# Patient Record
Sex: MALE | Race: WHITE | NOT HISPANIC OR LATINO | URBAN - METROPOLITAN AREA
[De-identification: names, ages, dates, MRNs, and addresses within clinical notes are randomized per-mention and may not be internally consistent; named-entity substitution may affect disease eponyms.]

---

## 2022-01-09 ENCOUNTER — INPATIENT (INPATIENT)
Facility: HOSPITAL | Age: 82
LOS: 4 days | Discharge: HOME | DRG: 177 | End: 2022-01-14
Attending: STUDENT IN AN ORGANIZED HEALTH CARE EDUCATION/TRAINING PROGRAM | Admitting: STUDENT IN AN ORGANIZED HEALTH CARE EDUCATION/TRAINING PROGRAM
Payer: MEDICARE

## 2022-01-09 VITALS
DIASTOLIC BLOOD PRESSURE: 75 MMHG | SYSTOLIC BLOOD PRESSURE: 151 MMHG | OXYGEN SATURATION: 95 % | HEART RATE: 90 BPM | TEMPERATURE: 98 F | RESPIRATION RATE: 20 BRPM

## 2022-01-09 DIAGNOSIS — J96.01 ACUTE RESPIRATORY FAILURE WITH HYPOXIA: ICD-10-CM

## 2022-01-09 LAB
ALBUMIN SERPL ELPH-MCNC: 3.8 G/DL — SIGNIFICANT CHANGE UP (ref 3.3–5)
ALP SERPL-CCNC: 81 U/L — SIGNIFICANT CHANGE UP (ref 40–120)
ALT FLD-CCNC: 18 U/L — SIGNIFICANT CHANGE UP (ref 10–45)
ANION GAP SERPL CALC-SCNC: 17 MMOL/L — SIGNIFICANT CHANGE UP (ref 5–17)
AST SERPL-CCNC: 15 U/L — SIGNIFICANT CHANGE UP (ref 10–40)
BASOPHILS # BLD AUTO: 0.03 K/UL — SIGNIFICANT CHANGE UP (ref 0–0.2)
BASOPHILS NFR BLD AUTO: 0.3 % — SIGNIFICANT CHANGE UP (ref 0–2)
BILIRUB SERPL-MCNC: 0.8 MG/DL — SIGNIFICANT CHANGE UP (ref 0.2–1.2)
BUN SERPL-MCNC: 17 MG/DL — SIGNIFICANT CHANGE UP (ref 7–23)
CALCIUM SERPL-MCNC: 8.9 MG/DL — SIGNIFICANT CHANGE UP (ref 8.4–10.5)
CHLORIDE SERPL-SCNC: 94 MMOL/L — LOW (ref 96–108)
CO2 SERPL-SCNC: 20 MMOL/L — LOW (ref 22–31)
CREAT SERPL-MCNC: 1.18 MG/DL — SIGNIFICANT CHANGE UP (ref 0.5–1.3)
D DIMER BLD IA.RAPID-MCNC: 353 NG/ML DDU — HIGH
EOSINOPHIL # BLD AUTO: 0.16 K/UL — SIGNIFICANT CHANGE UP (ref 0–0.5)
EOSINOPHIL NFR BLD AUTO: 1.7 % — SIGNIFICANT CHANGE UP (ref 0–6)
GLUCOSE SERPL-MCNC: 124 MG/DL — HIGH (ref 70–99)
HCT VFR BLD CALC: 43.3 % — SIGNIFICANT CHANGE UP (ref 39–50)
HGB BLD-MCNC: 14.6 G/DL — SIGNIFICANT CHANGE UP (ref 13–17)
IMM GRANULOCYTES NFR BLD AUTO: 0.6 % — SIGNIFICANT CHANGE UP (ref 0–1.5)
LYMPHOCYTES # BLD AUTO: 0.93 K/UL — LOW (ref 1–3.3)
LYMPHOCYTES # BLD AUTO: 9.8 % — LOW (ref 13–44)
MCHC RBC-ENTMCNC: 28.1 PG — SIGNIFICANT CHANGE UP (ref 27–34)
MCHC RBC-ENTMCNC: 33.7 GM/DL — SIGNIFICANT CHANGE UP (ref 32–36)
MCV RBC AUTO: 83.3 FL — SIGNIFICANT CHANGE UP (ref 80–100)
MONOCYTES # BLD AUTO: 1.27 K/UL — HIGH (ref 0–0.9)
MONOCYTES NFR BLD AUTO: 13.4 % — SIGNIFICANT CHANGE UP (ref 2–14)
NEUTROPHILS # BLD AUTO: 7.04 K/UL — SIGNIFICANT CHANGE UP (ref 1.8–7.4)
NEUTROPHILS NFR BLD AUTO: 74.2 % — SIGNIFICANT CHANGE UP (ref 43–77)
NRBC # BLD: 0 /100 WBCS — SIGNIFICANT CHANGE UP (ref 0–0)
PLATELET # BLD AUTO: 449 K/UL — HIGH (ref 150–400)
POTASSIUM SERPL-MCNC: 3.1 MMOL/L — LOW (ref 3.5–5.3)
POTASSIUM SERPL-SCNC: 3.1 MMOL/L — LOW (ref 3.5–5.3)
PROT SERPL-MCNC: 8 G/DL — SIGNIFICANT CHANGE UP (ref 6–8.3)
RBC # BLD: 5.2 M/UL — SIGNIFICANT CHANGE UP (ref 4.2–5.8)
RBC # FLD: 13.7 % — SIGNIFICANT CHANGE UP (ref 10.3–14.5)
SODIUM SERPL-SCNC: 131 MMOL/L — LOW (ref 135–145)
WBC # BLD: 9.49 K/UL — SIGNIFICANT CHANGE UP (ref 3.8–10.5)
WBC # FLD AUTO: 9.49 K/UL — SIGNIFICANT CHANGE UP (ref 3.8–10.5)

## 2022-01-09 PROCEDURE — 99223 1ST HOSP IP/OBS HIGH 75: CPT

## 2022-01-09 PROCEDURE — 99285 EMERGENCY DEPT VISIT HI MDM: CPT | Mod: CS,GC

## 2022-01-09 PROCEDURE — 71045 X-RAY EXAM CHEST 1 VIEW: CPT | Mod: 26

## 2022-01-09 RX ORDER — IPRATROPIUM/ALBUTEROL SULFATE 18-103MCG
3 AEROSOL WITH ADAPTER (GRAM) INHALATION ONCE
Refills: 0 | Status: COMPLETED | OUTPATIENT
Start: 2022-01-09 | End: 2022-01-09

## 2022-01-09 RX ORDER — DEXAMETHASONE 0.5 MG/5ML
6 ELIXIR ORAL ONCE
Refills: 0 | Status: COMPLETED | OUTPATIENT
Start: 2022-01-09 | End: 2022-01-09

## 2022-01-09 RX ORDER — SODIUM CHLORIDE 9 MG/ML
500 INJECTION INTRAMUSCULAR; INTRAVENOUS; SUBCUTANEOUS ONCE
Refills: 0 | Status: COMPLETED | OUTPATIENT
Start: 2022-01-09 | End: 2022-01-09

## 2022-01-09 RX ADMIN — Medication 3 MILLILITER(S): at 21:33

## 2022-01-09 RX ADMIN — Medication 6 MILLIGRAM(S): at 21:32

## 2022-01-09 RX ADMIN — SODIUM CHLORIDE 500 MILLILITER(S): 9 INJECTION INTRAMUSCULAR; INTRAVENOUS; SUBCUTANEOUS at 21:41

## 2022-01-09 RX ADMIN — SODIUM CHLORIDE 500 MILLILITER(S): 9 INJECTION INTRAMUSCULAR; INTRAVENOUS; SUBCUTANEOUS at 22:51

## 2022-01-09 NOTE — H&P ADULT - NSHPLABSRESULTS_GEN_ALL_CORE
Labs personally reviewed:                          14.6   9.49  )-----------( 449      ( 09 Jan 2022 21:57 )             43.3     01-09    131<L>  |  94<L>  |  17  ----------------------------<  124<H>  3.1<L>   |  20<L>  |  1.18    Ca    8.9      09 Jan 2022 21:57    TPro  8.0  /  Alb  3.8  /  TBili  0.8  /  DBili  x   /  AST  15  /  ALT  18  /  AlkPhos  81  01-09        LIVER FUNCTIONS - ( 09 Jan 2022 21:57 )  Alb: 3.8 g/dL / Pro: 8.0 g/dL / ALK PHOS: 81 U/L / ALT: 18 U/L / AST: 15 U/L / GGT: x               CAPILLARY BLOOD GLUCOSE          Imaging:  CXR personally reviewed: Bilateral patchy opacities. F/u official report.    EKG personally reviewed: Labs personally reviewed:                          14.6   9.49  )-----------( 449      ( 09 Jan 2022 21:57 )             43.3     01-09    131<L>  |  94<L>  |  17  ----------------------------<  124<H>  3.1<L>   |  20<L>  |  1.18    Ca    8.9      09 Jan 2022 21:57    TPro  8.0  /  Alb  3.8  /  TBili  0.8  /  DBili  x   /  AST  15  /  ALT  18  /  AlkPhos  81  01-09        LIVER FUNCTIONS - ( 09 Jan 2022 21:57 )  Alb: 3.8 g/dL / Pro: 8.0 g/dL / ALK PHOS: 81 U/L / ALT: 18 U/L / AST: 15 U/L / GGT: x               CAPILLARY BLOOD GLUCOSE          Imaging:  CXR personally reviewed: Bilateral patchy opacities. F/u official report.    EKG personally reviewed:  sinus rhythm

## 2022-01-09 NOTE — ED PROVIDER NOTE - CLINICAL SUMMARY MEDICAL DECISION MAKING FREE TEXT BOX
82 yo M with PMH of stent, HTN, HLD, LE edema p/w shortness of breath, diarrhea, low appetite. This is concerning for COVID, flu or pneumonia. will order RVP, CXR, labs, dexamethasone, 500 cc bolus. Pt was given a duoneb but had a run of aflutter to 180's, neb was immediately stopped. ecg done.

## 2022-01-09 NOTE — H&P ADULT - PROBLEM SELECTOR PLAN 1
presentation and imaging c/w COVID-19 infection   - F/u COVID-19 PCR   - IF covid pcr positive , can continued  Dexamethaone 6mg x 10 day course  - IF covid  PCR Positive  can start Remdesivir   - monitor liver tests   - Airborne + contact Isolation   - Observe off antibiotics   - Guaifenesin/ dextromethorphan PRN   - Incentive spirometry   - PRN MDI bronchodilators.

## 2022-01-09 NOTE — H&P ADULT - NSICDXFAMILYHX_GEN_ALL_CORE_FT
FAMILY HISTORY:  Father  Still living? Unknown  FH: prostate cancer, Age at diagnosis: Age Unknown    Mother  Still living? Unknown  FH: esophageal cancer, Age at diagnosis: Age Unknown

## 2022-01-09 NOTE — H&P ADULT - NSHPPHYSICALEXAM_GEN_ALL_CORE
Vital Signs Last 24 Hrs  T(C): 36.8 (09 Jan 2022 22:22), Max: 36.8 (09 Jan 2022 20:59)  T(F): 98.2 (09 Jan 2022 22:22), Max: 98.2 (09 Jan 2022 20:59)  HR: 82 (09 Jan 2022 22:51) (78 - 90)  BP: 130/65 (09 Jan 2022 22:51) (130/65 - 156/89)  BP(mean): 81 (09 Jan 2022 22:51) (81 - 81)  RR: 14 (09 Jan 2022 22:51) (14 - 22)  SpO2: 95% (09 Jan 2022 22:51) (90% - 97%) Vital Signs Last 24 Hrs  T(C): 36.8 (09 Jan 2022 22:22), Max: 36.8 (09 Jan 2022 20:59)  T(F): 98.2 (09 Jan 2022 22:22), Max: 98.2 (09 Jan 2022 20:59)  HR: 82 (09 Jan 2022 22:51) (78 - 90)  BP: 130/65 (09 Jan 2022 22:51) (130/65 - 156/89)  BP(mean): 81 (09 Jan 2022 22:51) (81 - 81)  RR: 14 (09 Jan 2022 22:51) (14 - 22)  SpO2: 95% (09 Jan 2022 22:51) (90% - 97%)    GENERAL: mild respiratory distress, well-developed  HEAD:  Atraumatic, Normocephalic  ENT: EOMI, PERRLA, conjunctiva and sclera clear,  moist mucosa no pharyngeal erythema or exudates   NECK: supple , no JVD   CHEST/LUNG: Clear to auscultation bilaterally; No wheeze, equal breath sounds bilaterally   BACK: No spinal tenderness,  No CVA tenderness   HEART: Regular rate and rhythm; No murmurs, rubs, or gallops  ABDOMEN: Soft, Nontender, Nondistended; Bowel sounds present  EXTREMITIES:  No clubbing, cyanosis, +1 pitting edema b/l   MSK: No joint swelling or effusions, ROM intact   PSYCH: Normal behavior/affect  NEUROLOGY: AAOx3, non-focal, cranial nerves intact  SKIN: Normal color, No rashes or lesions

## 2022-01-09 NOTE — ED PROVIDER NOTE - OBJECTIVE STATEMENT
82 yo M with PMH of stent, HTN, HLD, LE edema p/w shortness of breath, diarrhea, low appetite. He had a positive COVID exposure through his ex. He developed symptoms 9d ago. He has had 2 doses of pfizer and denies any history of lung disease. He is a former smoker. Prior to getting sick he was able to ambulate without difficulty. He denies fever, chest pain. 80 yo M with PMH of stent, HTN, HLD, LE edema p/w shortness of breath, diarrhea, low appetite. He had a positive COVID exposure through his ex. He developed symptoms 9d ago. He has had 2 doses of pfizer and denies any history of lung disease. He is a former smoker. Prior to getting sick he was able to ambulate without difficulty, now having difficulty ambulating due to dyspnea. He denies fever, chest pain. He has not taken any medications for this problem.

## 2022-01-09 NOTE — ED PROVIDER NOTE - ATTENDING CONTRIBUTION TO CARE
I performed a face to face history and physical exam of the patient and discussed their management with the resident. I reviewed the resident's note and agree with the documented findings and plan of care.     82 y/o male with multiple medical problems here with SOB, diarrhea, decrease appetite, weight loss, no fever, no chills, no CP, pt vaccinated, no booster, + COVID exposure, on exam pt hypoxic, increased RR, Lungs rales LLB, b/l pedal edema baseline as per patient, cardiac no m/g/r, pt desating to 93% while talking, will place on Nc 1)CBC, CMP,  D DIMER, RVP 2)CXR, EKG 3)Decadron, nebulizer, 4)admit

## 2022-01-09 NOTE — H&P ADULT - PROBLEM SELECTOR PLAN 2
symptoms  suggestive of HF  - f/u BNP ( added to initial labs)   - IF BNP very elevated , can give Lasix 80mg IV x 1 dose , and obtain echocardiogram

## 2022-01-09 NOTE — ED PROVIDER NOTE - PHYSICAL EXAMINATION
General: WN/WD NAD  Head: Atraumatic, normocephalic  Eyes: EOM grossly in tact, no scleral icterus, no discharge  ENT: moist mucous membranes  Neurology: A&Ox3, nonfocal, FLOWERS x 4  Respiratory: poor air movement, no crackles, no wheezing, increased resp effort, )2 sat 93% while speaking, 90% while ambulatory  CV: RRR, good s1/s2, no S3, Extremities warm and well perfused  Abdominal: Soft, non-distended, non-tender, no masses  Extremities: non-pitting edema of B/L LE, no deformities  Skin: warm and dry. No rashes

## 2022-01-09 NOTE — ED ADULT NURSE NOTE - OBJECTIVE STATEMENT
80 y/o male presenting to Ed ambulatory, A&OX3, complaining of shortness of breath. Pt reports covid exposure from significant other. Pt states he has been feeling weak, short of breath, decreased appetite and diarrhea for 9 days. Pt reports being vaccinated with pfizer for covid. pt denies fevers, chills, cough, n/v, chest pain, headache, palpitations. breathing spontaneous, pt tachypneic reports short of breath with exertion. abd soft nontender. pt placed on cardiac monitor showing NSR. Safety and comfort measures provided, bed locked and in lowest position, side rails up for safety. Call bell within reach. Awaiting md matias

## 2022-01-09 NOTE — H&P ADULT - NSHPREVIEWOFSYSTEMS_GEN_ALL_CORE
CONSTITUTIONAL: +  weakness, no fevers or chills  EYES/ENT: No visual changes;  No dysphagia  NECK: No pain or stiffness  RESPIRATORY: No cough, wheezing, hemoptysis; + shortness of breath  CARDIOVASCULAR: No chest pain or palpitations;+  lower extremity edema  EXTREMITIES: no le edema, cyanosis, clubbing  GASTROINTESTINAL: No abdominal or epigastric pain. + nausea, no vomiting, or hematemesis; + diarrhea or constipation. No melena or hematochezia.  BACK: No back pain  GENITOURINARY: No dysuria, frequency or hematuria  NEUROLOGICAL: No numbness or weakness  MSK: no joint swelling or pain  SKIN: No itching, burning, rashes, or lesions   PSYCH: no agitation  All other review of systems is negative unless indicated above.

## 2022-01-09 NOTE — H&P ADULT - HISTORY OF PRESENT ILLNESS
Patient is a 81 year old male  with PMH of cad s/p stent, HTN, HLD, LE edema presents for worsening of shortness of breath over the past several days.    Patient is a 81 year old male  with PMH of cad s/p stent, HTN, HLD, LE edema presents for worsening of shortness of breath over the past several days.   Patient reports being exposed to covid  about nine days prior to admission, since then he reports generalized weakness and fatigue , poor appetite and po intake , and intermittent abdominal cramping , he had a few episodes of diarrhea which have since resolved. Over the past five days, patient reports increased shortness of breath mostly when he lays flat in bed , he also reports increased dyspnea with exertion  and decreased exercise tolerance , he has no cough , no fever or chills , no chest pain. He reports longstanding lower extremity edema.

## 2022-01-09 NOTE — ED ADULT NURSE NOTE - NSIMPLEMENTINTERV_GEN_ALL_ED
Implemented All Fall with Harm Risk Interventions:  [place] to call system. Call bell, personal items and telephone within reach. Instruct patient to call for assistance. Room bathroom lighting operational. Non-slip footwear when patient is off stretcher. Physically safe environment: no spills, clutter or unnecessary equipment. Stretcher in lowest position, wheels locked, appropriate side rails in place. Provide visual cue, wrist band, yellow gown, etc. Monitor gait and stability. Monitor for mental status changes and reorient to person, place, and time. Review medications for side effects contributing to fall risk. Reinforce activity limits and safety measures with patient and family. Provide visual clues: red socks.

## 2022-01-10 DIAGNOSIS — E87.6 HYPOKALEMIA: ICD-10-CM

## 2022-01-10 DIAGNOSIS — I50.9 HEART FAILURE, UNSPECIFIED: ICD-10-CM

## 2022-01-10 DIAGNOSIS — U07.1 COVID-19: ICD-10-CM

## 2022-01-10 DIAGNOSIS — I10 ESSENTIAL (PRIMARY) HYPERTENSION: ICD-10-CM

## 2022-01-10 DIAGNOSIS — I25.10 ATHEROSCLEROTIC HEART DISEASE OF NATIVE CORONARY ARTERY WITHOUT ANGINA PECTORIS: ICD-10-CM

## 2022-01-10 DIAGNOSIS — Z29.9 ENCOUNTER FOR PROPHYLACTIC MEASURES, UNSPECIFIED: ICD-10-CM

## 2022-01-10 DIAGNOSIS — Z98.890 OTHER SPECIFIED POSTPROCEDURAL STATES: Chronic | ICD-10-CM

## 2022-01-10 LAB
ALBUMIN SERPL ELPH-MCNC: 3.5 G/DL — SIGNIFICANT CHANGE UP (ref 3.3–5)
ALP SERPL-CCNC: 75 U/L — SIGNIFICANT CHANGE UP (ref 40–120)
ALT FLD-CCNC: 16 U/L — SIGNIFICANT CHANGE UP (ref 10–45)
ANION GAP SERPL CALC-SCNC: 15 MMOL/L — SIGNIFICANT CHANGE UP (ref 5–17)
AST SERPL-CCNC: 12 U/L — SIGNIFICANT CHANGE UP (ref 10–40)
BILIRUB SERPL-MCNC: 0.5 MG/DL — SIGNIFICANT CHANGE UP (ref 0.2–1.2)
BUN SERPL-MCNC: 21 MG/DL — SIGNIFICANT CHANGE UP (ref 7–23)
CALCIUM SERPL-MCNC: 9 MG/DL — SIGNIFICANT CHANGE UP (ref 8.4–10.5)
CHLORIDE SERPL-SCNC: 100 MMOL/L — SIGNIFICANT CHANGE UP (ref 96–108)
CO2 SERPL-SCNC: 19 MMOL/L — LOW (ref 22–31)
CREAT SERPL-MCNC: 1.3 MG/DL — SIGNIFICANT CHANGE UP (ref 0.5–1.3)
GLUCOSE SERPL-MCNC: 183 MG/DL — HIGH (ref 70–99)
HCT VFR BLD CALC: 39.8 % — SIGNIFICANT CHANGE UP (ref 39–50)
HGB BLD-MCNC: 13.3 G/DL — SIGNIFICANT CHANGE UP (ref 13–17)
MCHC RBC-ENTMCNC: 28.1 PG — SIGNIFICANT CHANGE UP (ref 27–34)
MCHC RBC-ENTMCNC: 33.4 GM/DL — SIGNIFICANT CHANGE UP (ref 32–36)
MCV RBC AUTO: 84.1 FL — SIGNIFICANT CHANGE UP (ref 80–100)
NRBC # BLD: 0 /100 WBCS — SIGNIFICANT CHANGE UP (ref 0–0)
NT-PROBNP SERPL-SCNC: 576 PG/ML — HIGH (ref 0–300)
PLATELET # BLD AUTO: 429 K/UL — HIGH (ref 150–400)
POTASSIUM SERPL-MCNC: 4.7 MMOL/L — SIGNIFICANT CHANGE UP (ref 3.5–5.3)
POTASSIUM SERPL-SCNC: 4.7 MMOL/L — SIGNIFICANT CHANGE UP (ref 3.5–5.3)
PROT SERPL-MCNC: 7.3 G/DL — SIGNIFICANT CHANGE UP (ref 6–8.3)
RAPID RVP RESULT: SIGNIFICANT CHANGE UP
RBC # BLD: 4.73 M/UL — SIGNIFICANT CHANGE UP (ref 4.2–5.8)
RBC # FLD: 13.7 % — SIGNIFICANT CHANGE UP (ref 10.3–14.5)
SARS-COV-2 RNA SPEC QL NAA+PROBE: DETECTED
SARS-COV-2 RNA SPEC QL NAA+PROBE: SIGNIFICANT CHANGE UP
SODIUM SERPL-SCNC: 134 MMOL/L — LOW (ref 135–145)
TROPONIN T, HIGH SENSITIVITY RESULT: 30 NG/L — SIGNIFICANT CHANGE UP (ref 0–51)
WBC # BLD: 6.69 K/UL — SIGNIFICANT CHANGE UP (ref 3.8–10.5)
WBC # FLD AUTO: 6.69 K/UL — SIGNIFICANT CHANGE UP (ref 3.8–10.5)

## 2022-01-10 PROCEDURE — 99233 SBSQ HOSP IP/OBS HIGH 50: CPT

## 2022-01-10 RX ORDER — ASPIRIN/CALCIUM CARB/MAGNESIUM 324 MG
0 TABLET ORAL
Qty: 0 | Refills: 0 | DISCHARGE

## 2022-01-10 RX ORDER — LOSARTAN POTASSIUM 100 MG/1
100 TABLET, FILM COATED ORAL DAILY
Refills: 0 | Status: DISCONTINUED | OUTPATIENT
Start: 2022-01-10 | End: 2022-01-14

## 2022-01-10 RX ORDER — FUROSEMIDE 40 MG
80 TABLET ORAL DAILY
Refills: 0 | Status: DISCONTINUED | OUTPATIENT
Start: 2022-01-10 | End: 2022-01-14

## 2022-01-10 RX ORDER — FUROSEMIDE 40 MG
1 TABLET ORAL
Qty: 0 | Refills: 0 | DISCHARGE

## 2022-01-10 RX ORDER — ALBUTEROL 90 UG/1
2 AEROSOL, METERED ORAL EVERY 4 HOURS
Refills: 0 | Status: DISCONTINUED | OUTPATIENT
Start: 2022-01-10 | End: 2022-01-14

## 2022-01-10 RX ORDER — POTASSIUM CHLORIDE 20 MEQ
40 PACKET (EA) ORAL ONCE
Refills: 0 | Status: COMPLETED | OUTPATIENT
Start: 2022-01-10 | End: 2022-01-10

## 2022-01-10 RX ORDER — ASPIRIN/CALCIUM CARB/MAGNESIUM 324 MG
81 TABLET ORAL DAILY
Refills: 0 | Status: DISCONTINUED | OUTPATIENT
Start: 2022-01-10 | End: 2022-01-14

## 2022-01-10 RX ORDER — REMDESIVIR 5 MG/ML
200 INJECTION INTRAVENOUS EVERY 24 HOURS
Refills: 0 | Status: COMPLETED | OUTPATIENT
Start: 2022-01-10 | End: 2022-01-10

## 2022-01-10 RX ORDER — REMDESIVIR 5 MG/ML
100 INJECTION INTRAVENOUS EVERY 24 HOURS
Refills: 0 | Status: DISCONTINUED | OUTPATIENT
Start: 2022-01-11 | End: 2022-01-14

## 2022-01-10 RX ORDER — DEXAMETHASONE 0.5 MG/5ML
6 ELIXIR ORAL DAILY
Refills: 0 | Status: DISCONTINUED | OUTPATIENT
Start: 2022-01-10 | End: 2022-01-14

## 2022-01-10 RX ORDER — LOSARTAN POTASSIUM 100 MG/1
1 TABLET, FILM COATED ORAL
Qty: 0 | Refills: 0 | DISCHARGE

## 2022-01-10 RX ORDER — DEXAMETHASONE 0.5 MG/5ML
6 ELIXIR ORAL DAILY
Refills: 0 | Status: DISCONTINUED | OUTPATIENT
Start: 2022-01-10 | End: 2022-01-10

## 2022-01-10 RX ORDER — ENOXAPARIN SODIUM 100 MG/ML
40 INJECTION SUBCUTANEOUS DAILY
Refills: 0 | Status: DISCONTINUED | OUTPATIENT
Start: 2022-01-10 | End: 2022-01-14

## 2022-01-10 RX ORDER — METOPROLOL TARTRATE 50 MG
1 TABLET ORAL
Qty: 0 | Refills: 0 | DISCHARGE

## 2022-01-10 RX ORDER — AMLODIPINE BESYLATE 2.5 MG/1
1 TABLET ORAL
Qty: 0 | Refills: 0 | DISCHARGE

## 2022-01-10 RX ORDER — METOPROLOL TARTRATE 50 MG
100 TABLET ORAL DAILY
Refills: 0 | Status: DISCONTINUED | OUTPATIENT
Start: 2022-01-10 | End: 2022-01-14

## 2022-01-10 RX ORDER — ATORVASTATIN CALCIUM 80 MG/1
40 TABLET, FILM COATED ORAL AT BEDTIME
Refills: 0 | Status: DISCONTINUED | OUTPATIENT
Start: 2022-01-10 | End: 2022-01-14

## 2022-01-10 RX ORDER — ACETAMINOPHEN 500 MG
650 TABLET ORAL EVERY 4 HOURS
Refills: 0 | Status: DISCONTINUED | OUTPATIENT
Start: 2022-01-10 | End: 2022-01-14

## 2022-01-10 RX ORDER — GUAIFENESIN/DEXTROMETHORPHAN 600MG-30MG
10 TABLET, EXTENDED RELEASE 12 HR ORAL EVERY 4 HOURS
Refills: 0 | Status: DISCONTINUED | OUTPATIENT
Start: 2022-01-10 | End: 2022-01-14

## 2022-01-10 RX ORDER — REMDESIVIR 5 MG/ML
INJECTION INTRAVENOUS
Refills: 0 | Status: DISCONTINUED | OUTPATIENT
Start: 2022-01-10 | End: 2022-01-14

## 2022-01-10 RX ORDER — ATORVASTATIN CALCIUM 80 MG/1
1 TABLET, FILM COATED ORAL
Qty: 0 | Refills: 0 | DISCHARGE

## 2022-01-10 RX ORDER — AMLODIPINE BESYLATE 2.5 MG/1
10 TABLET ORAL DAILY
Refills: 0 | Status: DISCONTINUED | OUTPATIENT
Start: 2022-01-10 | End: 2022-01-14

## 2022-01-10 RX ADMIN — Medication 80 MILLIGRAM(S): at 06:36

## 2022-01-10 RX ADMIN — ENOXAPARIN SODIUM 40 MILLIGRAM(S): 100 INJECTION SUBCUTANEOUS at 11:51

## 2022-01-10 RX ADMIN — Medication 81 MILLIGRAM(S): at 11:52

## 2022-01-10 RX ADMIN — AMLODIPINE BESYLATE 10 MILLIGRAM(S): 2.5 TABLET ORAL at 06:36

## 2022-01-10 RX ADMIN — LOSARTAN POTASSIUM 100 MILLIGRAM(S): 100 TABLET, FILM COATED ORAL at 06:36

## 2022-01-10 RX ADMIN — Medication 40 MILLIEQUIVALENT(S): at 00:40

## 2022-01-10 RX ADMIN — ATORVASTATIN CALCIUM 40 MILLIGRAM(S): 80 TABLET, FILM COATED ORAL at 22:01

## 2022-01-10 RX ADMIN — Medication 100 MILLIGRAM(S): at 22:01

## 2022-01-10 RX ADMIN — Medication 6 MILLIGRAM(S): at 06:36

## 2022-01-10 RX ADMIN — REMDESIVIR 500 MILLIGRAM(S): 5 INJECTION INTRAVENOUS at 10:26

## 2022-01-10 NOTE — PROGRESS NOTE ADULT - PROBLEM SELECTOR PLAN 5
- potassium 40meq x 1   - monitor K and replete as needed  - AM labs pending this morning 1/10, RN/NP aware

## 2022-01-11 LAB
ALBUMIN SERPL ELPH-MCNC: 3.6 G/DL — SIGNIFICANT CHANGE UP (ref 3.3–5)
ALBUMIN SERPL ELPH-MCNC: 3.7 G/DL — SIGNIFICANT CHANGE UP (ref 3.3–5)
ALP SERPL-CCNC: 73 U/L — SIGNIFICANT CHANGE UP (ref 40–120)
ALP SERPL-CCNC: 73 U/L — SIGNIFICANT CHANGE UP (ref 40–120)
ALT FLD-CCNC: 20 U/L — SIGNIFICANT CHANGE UP (ref 10–45)
ALT FLD-CCNC: 21 U/L — SIGNIFICANT CHANGE UP (ref 10–45)
ANION GAP SERPL CALC-SCNC: 13 MMOL/L — SIGNIFICANT CHANGE UP (ref 5–17)
AST SERPL-CCNC: 14 U/L — SIGNIFICANT CHANGE UP (ref 10–40)
AST SERPL-CCNC: 14 U/L — SIGNIFICANT CHANGE UP (ref 10–40)
BILIRUB DIRECT SERPL-MCNC: 0.2 MG/DL — SIGNIFICANT CHANGE UP (ref 0–0.3)
BILIRUB INDIRECT FLD-MCNC: 0.4 MG/DL — SIGNIFICANT CHANGE UP (ref 0.2–1)
BILIRUB SERPL-MCNC: 0.5 MG/DL — SIGNIFICANT CHANGE UP (ref 0.2–1.2)
BILIRUB SERPL-MCNC: 0.6 MG/DL — SIGNIFICANT CHANGE UP (ref 0.2–1.2)
BUN SERPL-MCNC: 20 MG/DL — SIGNIFICANT CHANGE UP (ref 7–23)
CALCIUM SERPL-MCNC: 9 MG/DL — SIGNIFICANT CHANGE UP (ref 8.4–10.5)
CHLORIDE SERPL-SCNC: 101 MMOL/L — SIGNIFICANT CHANGE UP (ref 96–108)
CO2 SERPL-SCNC: 22 MMOL/L — SIGNIFICANT CHANGE UP (ref 22–31)
CREAT SERPL-MCNC: 1.02 MG/DL — SIGNIFICANT CHANGE UP (ref 0.5–1.3)
CREAT SERPL-MCNC: 1.04 MG/DL — SIGNIFICANT CHANGE UP (ref 0.5–1.3)
D DIMER BLD IA.RAPID-MCNC: 390 NG/ML DDU — HIGH
GLUCOSE SERPL-MCNC: 151 MG/DL — HIGH (ref 70–99)
HCT VFR BLD CALC: 41.2 % — SIGNIFICANT CHANGE UP (ref 39–50)
HGB BLD-MCNC: 13.7 G/DL — SIGNIFICANT CHANGE UP (ref 13–17)
INR BLD: 1.14 RATIO — SIGNIFICANT CHANGE UP (ref 0.88–1.16)
MCHC RBC-ENTMCNC: 28.4 PG — SIGNIFICANT CHANGE UP (ref 27–34)
MCHC RBC-ENTMCNC: 33.3 GM/DL — SIGNIFICANT CHANGE UP (ref 32–36)
MCV RBC AUTO: 85.5 FL — SIGNIFICANT CHANGE UP (ref 80–100)
NRBC # BLD: 0 /100 WBCS — SIGNIFICANT CHANGE UP (ref 0–0)
PLATELET # BLD AUTO: 517 K/UL — HIGH (ref 150–400)
POTASSIUM SERPL-MCNC: 3.8 MMOL/L — SIGNIFICANT CHANGE UP (ref 3.5–5.3)
POTASSIUM SERPL-SCNC: 3.8 MMOL/L — SIGNIFICANT CHANGE UP (ref 3.5–5.3)
PROT SERPL-MCNC: 7.4 G/DL — SIGNIFICANT CHANGE UP (ref 6–8.3)
PROT SERPL-MCNC: 7.5 G/DL — SIGNIFICANT CHANGE UP (ref 6–8.3)
PROTHROM AB SERPL-ACNC: 13.6 SEC — SIGNIFICANT CHANGE UP (ref 10.6–13.6)
RBC # BLD: 4.82 M/UL — SIGNIFICANT CHANGE UP (ref 4.2–5.8)
RBC # FLD: 14 % — SIGNIFICANT CHANGE UP (ref 10.3–14.5)
SODIUM SERPL-SCNC: 136 MMOL/L — SIGNIFICANT CHANGE UP (ref 135–145)
WBC # BLD: 11.68 K/UL — HIGH (ref 3.8–10.5)
WBC # FLD AUTO: 11.68 K/UL — HIGH (ref 3.8–10.5)

## 2022-01-11 PROCEDURE — 99233 SBSQ HOSP IP/OBS HIGH 50: CPT

## 2022-01-11 RX ADMIN — REMDESIVIR 500 MILLIGRAM(S): 5 INJECTION INTRAVENOUS at 11:21

## 2022-01-11 RX ADMIN — ATORVASTATIN CALCIUM 40 MILLIGRAM(S): 80 TABLET, FILM COATED ORAL at 22:03

## 2022-01-11 RX ADMIN — Medication 80 MILLIGRAM(S): at 06:00

## 2022-01-11 RX ADMIN — LOSARTAN POTASSIUM 100 MILLIGRAM(S): 100 TABLET, FILM COATED ORAL at 06:00

## 2022-01-11 RX ADMIN — AMLODIPINE BESYLATE 10 MILLIGRAM(S): 2.5 TABLET ORAL at 06:00

## 2022-01-11 RX ADMIN — ENOXAPARIN SODIUM 40 MILLIGRAM(S): 100 INJECTION SUBCUTANEOUS at 11:21

## 2022-01-11 RX ADMIN — Medication 6 MILLIGRAM(S): at 06:01

## 2022-01-11 RX ADMIN — Medication 100 MILLIGRAM(S): at 22:03

## 2022-01-11 RX ADMIN — Medication 81 MILLIGRAM(S): at 11:21

## 2022-01-11 NOTE — PATIENT PROFILE ADULT - FALL HARM RISK - UNIVERSAL INTERVENTIONS
Bed in lowest position, wheels locked, appropriate side rails in place/Call bell, personal items and telephone in reach/Instruct patient to call for assistance before getting out of bed or chair/Non-slip footwear when patient is out of bed/[place] to call system/Physically safe environment - no spills, clutter or unnecessary equipment/Purposeful Proactive Rounding/Room/bathroom lighting operational, light cord in reach

## 2022-01-12 LAB
ALBUMIN SERPL ELPH-MCNC: 3.4 G/DL — SIGNIFICANT CHANGE UP (ref 3.3–5)
ALBUMIN SERPL ELPH-MCNC: 3.5 G/DL — SIGNIFICANT CHANGE UP (ref 3.3–5)
ALP SERPL-CCNC: 71 U/L — SIGNIFICANT CHANGE UP (ref 40–120)
ALP SERPL-CCNC: 72 U/L — SIGNIFICANT CHANGE UP (ref 40–120)
ALT FLD-CCNC: 21 U/L — SIGNIFICANT CHANGE UP (ref 10–45)
ALT FLD-CCNC: 23 U/L — SIGNIFICANT CHANGE UP (ref 10–45)
ANION GAP SERPL CALC-SCNC: 15 MMOL/L — SIGNIFICANT CHANGE UP (ref 5–17)
AST SERPL-CCNC: 14 U/L — SIGNIFICANT CHANGE UP (ref 10–40)
AST SERPL-CCNC: 17 U/L — SIGNIFICANT CHANGE UP (ref 10–40)
BILIRUB DIRECT SERPL-MCNC: 0.1 MG/DL — SIGNIFICANT CHANGE UP (ref 0–0.3)
BILIRUB INDIRECT FLD-MCNC: 0.4 MG/DL — SIGNIFICANT CHANGE UP (ref 0.2–1)
BILIRUB SERPL-MCNC: 0.5 MG/DL — SIGNIFICANT CHANGE UP (ref 0.2–1.2)
BILIRUB SERPL-MCNC: 0.5 MG/DL — SIGNIFICANT CHANGE UP (ref 0.2–1.2)
BUN SERPL-MCNC: 29 MG/DL — HIGH (ref 7–23)
CALCIUM SERPL-MCNC: 9.1 MG/DL — SIGNIFICANT CHANGE UP (ref 8.4–10.5)
CHLORIDE SERPL-SCNC: 102 MMOL/L — SIGNIFICANT CHANGE UP (ref 96–108)
CO2 SERPL-SCNC: 20 MMOL/L — LOW (ref 22–31)
CREAT SERPL-MCNC: 1.18 MG/DL — SIGNIFICANT CHANGE UP (ref 0.5–1.3)
CREAT SERPL-MCNC: 1.21 MG/DL — SIGNIFICANT CHANGE UP (ref 0.5–1.3)
GLUCOSE SERPL-MCNC: 140 MG/DL — HIGH (ref 70–99)
HCT VFR BLD CALC: 42.3 % — SIGNIFICANT CHANGE UP (ref 39–50)
HGB BLD-MCNC: 13.9 G/DL — SIGNIFICANT CHANGE UP (ref 13–17)
INR BLD: 1.12 RATIO — SIGNIFICANT CHANGE UP (ref 0.88–1.16)
MCHC RBC-ENTMCNC: 28 PG — SIGNIFICANT CHANGE UP (ref 27–34)
MCHC RBC-ENTMCNC: 32.9 GM/DL — SIGNIFICANT CHANGE UP (ref 32–36)
MCV RBC AUTO: 85.3 FL — SIGNIFICANT CHANGE UP (ref 80–100)
NRBC # BLD: 0 /100 WBCS — SIGNIFICANT CHANGE UP (ref 0–0)
PLATELET # BLD AUTO: 536 K/UL — HIGH (ref 150–400)
POTASSIUM SERPL-MCNC: 4 MMOL/L — SIGNIFICANT CHANGE UP (ref 3.5–5.3)
POTASSIUM SERPL-SCNC: 4 MMOL/L — SIGNIFICANT CHANGE UP (ref 3.5–5.3)
PROT SERPL-MCNC: 7.1 G/DL — SIGNIFICANT CHANGE UP (ref 6–8.3)
PROT SERPL-MCNC: 7.3 G/DL — SIGNIFICANT CHANGE UP (ref 6–8.3)
PROTHROM AB SERPL-ACNC: 13.4 SEC — SIGNIFICANT CHANGE UP (ref 10.6–13.6)
RBC # BLD: 4.96 M/UL — SIGNIFICANT CHANGE UP (ref 4.2–5.8)
RBC # FLD: 14.2 % — SIGNIFICANT CHANGE UP (ref 10.3–14.5)
SODIUM SERPL-SCNC: 137 MMOL/L — SIGNIFICANT CHANGE UP (ref 135–145)
WBC # BLD: 10.88 K/UL — HIGH (ref 3.8–10.5)
WBC # FLD AUTO: 10.88 K/UL — HIGH (ref 3.8–10.5)

## 2022-01-12 PROCEDURE — 99232 SBSQ HOSP IP/OBS MODERATE 35: CPT

## 2022-01-12 RX ADMIN — AMLODIPINE BESYLATE 10 MILLIGRAM(S): 2.5 TABLET ORAL at 05:39

## 2022-01-12 RX ADMIN — Medication 100 MILLIGRAM(S): at 22:23

## 2022-01-12 RX ADMIN — Medication 6 MILLIGRAM(S): at 05:40

## 2022-01-12 RX ADMIN — ENOXAPARIN SODIUM 40 MILLIGRAM(S): 100 INJECTION SUBCUTANEOUS at 11:24

## 2022-01-12 RX ADMIN — LOSARTAN POTASSIUM 100 MILLIGRAM(S): 100 TABLET, FILM COATED ORAL at 05:39

## 2022-01-12 RX ADMIN — Medication 80 MILLIGRAM(S): at 05:39

## 2022-01-12 RX ADMIN — Medication 81 MILLIGRAM(S): at 11:24

## 2022-01-12 RX ADMIN — REMDESIVIR 500 MILLIGRAM(S): 5 INJECTION INTRAVENOUS at 11:25

## 2022-01-12 RX ADMIN — ATORVASTATIN CALCIUM 40 MILLIGRAM(S): 80 TABLET, FILM COATED ORAL at 22:23

## 2022-01-12 NOTE — DISCHARGE NOTE NURSING/CASE MANAGEMENT/SOCIAL WORK - NSDCPNINST_GEN_ALL_CORE
call for  follow up appointment  with primary care  md   diet  medications activity  as per md   any severe pain nausea  vomiting fevers increased weakness any  problems call md call 911 [facility]  EMERGENCY ROOM

## 2022-01-12 NOTE — DISCHARGE NOTE NURSING/CASE MANAGEMENT/SOCIAL WORK - NSDCPEFALRISK_GEN_ALL_CORE
For information on Fall & Injury Prevention, visit: [email or web address] OR  [email or web address] OR  https://www.cdc.gov/steadi/patient.html

## 2022-01-13 LAB
ALBUMIN SERPL ELPH-MCNC: 3.5 G/DL — SIGNIFICANT CHANGE UP (ref 3.3–5)
ALP SERPL-CCNC: 68 U/L — SIGNIFICANT CHANGE UP (ref 40–120)
ALT FLD-CCNC: 25 U/L — SIGNIFICANT CHANGE UP (ref 10–45)
ANION GAP SERPL CALC-SCNC: 14 MMOL/L — SIGNIFICANT CHANGE UP (ref 5–17)
APTT BLD: 27 SEC — LOW (ref 27.5–35.5)
AST SERPL-CCNC: 14 U/L — SIGNIFICANT CHANGE UP (ref 10–40)
BILIRUB DIRECT SERPL-MCNC: 0.2 MG/DL — SIGNIFICANT CHANGE UP (ref 0–0.3)
BILIRUB INDIRECT FLD-MCNC: 0.3 MG/DL — SIGNIFICANT CHANGE UP (ref 0.2–1)
BILIRUB SERPL-MCNC: 0.5 MG/DL — SIGNIFICANT CHANGE UP (ref 0.2–1.2)
BUN SERPL-MCNC: 35 MG/DL — HIGH (ref 7–23)
CALCIUM SERPL-MCNC: 9 MG/DL — SIGNIFICANT CHANGE UP (ref 8.4–10.5)
CHLORIDE SERPL-SCNC: 101 MMOL/L — SIGNIFICANT CHANGE UP (ref 96–108)
CO2 SERPL-SCNC: 21 MMOL/L — LOW (ref 22–31)
CREAT SERPL-MCNC: 1.26 MG/DL — SIGNIFICANT CHANGE UP (ref 0.5–1.3)
CREAT SERPL-MCNC: 1.27 MG/DL — SIGNIFICANT CHANGE UP (ref 0.5–1.3)
GLUCOSE BLDC GLUCOMTR-MCNC: 149 MG/DL — HIGH (ref 70–99)
GLUCOSE SERPL-MCNC: 152 MG/DL — HIGH (ref 70–99)
HCT VFR BLD CALC: 42 % — SIGNIFICANT CHANGE UP (ref 39–50)
HGB BLD-MCNC: 14.2 G/DL — SIGNIFICANT CHANGE UP (ref 13–17)
INR BLD: 1.11 RATIO — SIGNIFICANT CHANGE UP (ref 0.88–1.16)
INR BLD: 1.11 RATIO — SIGNIFICANT CHANGE UP (ref 0.88–1.16)
MAGNESIUM SERPL-MCNC: 1.8 MG/DL — SIGNIFICANT CHANGE UP (ref 1.6–2.6)
MCHC RBC-ENTMCNC: 28.3 PG — SIGNIFICANT CHANGE UP (ref 27–34)
MCHC RBC-ENTMCNC: 33.8 GM/DL — SIGNIFICANT CHANGE UP (ref 32–36)
MCV RBC AUTO: 83.8 FL — SIGNIFICANT CHANGE UP (ref 80–100)
NRBC # BLD: 0 /100 WBCS — SIGNIFICANT CHANGE UP (ref 0–0)
PHOSPHATE SERPL-MCNC: 3 MG/DL — SIGNIFICANT CHANGE UP (ref 2.5–4.5)
PLATELET # BLD AUTO: 589 K/UL — HIGH (ref 150–400)
POTASSIUM SERPL-MCNC: 3.8 MMOL/L — SIGNIFICANT CHANGE UP (ref 3.5–5.3)
POTASSIUM SERPL-SCNC: 3.8 MMOL/L — SIGNIFICANT CHANGE UP (ref 3.5–5.3)
PROT SERPL-MCNC: 6.9 G/DL — SIGNIFICANT CHANGE UP (ref 6–8.3)
PROTHROM AB SERPL-ACNC: 13.2 SEC — SIGNIFICANT CHANGE UP (ref 10.6–13.6)
PROTHROM AB SERPL-ACNC: 13.3 SEC — SIGNIFICANT CHANGE UP (ref 10.6–13.6)
RBC # BLD: 5.01 M/UL — SIGNIFICANT CHANGE UP (ref 4.2–5.8)
RBC # FLD: 14.2 % — SIGNIFICANT CHANGE UP (ref 10.3–14.5)
SODIUM SERPL-SCNC: 136 MMOL/L — SIGNIFICANT CHANGE UP (ref 135–145)
WBC # BLD: 9.23 K/UL — SIGNIFICANT CHANGE UP (ref 3.8–10.5)
WBC # FLD AUTO: 9.23 K/UL — SIGNIFICANT CHANGE UP (ref 3.8–10.5)

## 2022-01-13 PROCEDURE — 99232 SBSQ HOSP IP/OBS MODERATE 35: CPT

## 2022-01-13 RX ADMIN — Medication 81 MILLIGRAM(S): at 11:03

## 2022-01-13 RX ADMIN — Medication 100 MILLIGRAM(S): at 21:16

## 2022-01-13 RX ADMIN — AMLODIPINE BESYLATE 10 MILLIGRAM(S): 2.5 TABLET ORAL at 05:40

## 2022-01-13 RX ADMIN — REMDESIVIR 500 MILLIGRAM(S): 5 INJECTION INTRAVENOUS at 11:03

## 2022-01-13 RX ADMIN — LOSARTAN POTASSIUM 100 MILLIGRAM(S): 100 TABLET, FILM COATED ORAL at 05:40

## 2022-01-13 RX ADMIN — Medication 6 MILLIGRAM(S): at 05:40

## 2022-01-13 RX ADMIN — ENOXAPARIN SODIUM 40 MILLIGRAM(S): 100 INJECTION SUBCUTANEOUS at 11:03

## 2022-01-13 RX ADMIN — ATORVASTATIN CALCIUM 40 MILLIGRAM(S): 80 TABLET, FILM COATED ORAL at 21:17

## 2022-01-13 RX ADMIN — Medication 80 MILLIGRAM(S): at 05:40

## 2022-01-14 VITALS
HEART RATE: 98 BPM | OXYGEN SATURATION: 94 % | SYSTOLIC BLOOD PRESSURE: 117 MMHG | TEMPERATURE: 97 F | DIASTOLIC BLOOD PRESSURE: 63 MMHG | RESPIRATION RATE: 18 BRPM

## 2022-01-14 LAB
ALBUMIN SERPL ELPH-MCNC: 3.2 G/DL — LOW (ref 3.3–5)
ALP SERPL-CCNC: 64 U/L — SIGNIFICANT CHANGE UP (ref 40–120)
ALT FLD-CCNC: 28 U/L — SIGNIFICANT CHANGE UP (ref 10–45)
ANION GAP SERPL CALC-SCNC: 15 MMOL/L — SIGNIFICANT CHANGE UP (ref 5–17)
AST SERPL-CCNC: 15 U/L — SIGNIFICANT CHANGE UP (ref 10–40)
BILIRUB DIRECT SERPL-MCNC: 0.2 MG/DL — SIGNIFICANT CHANGE UP (ref 0–0.3)
BILIRUB INDIRECT FLD-MCNC: 0.3 MG/DL — SIGNIFICANT CHANGE UP (ref 0.2–1)
BILIRUB SERPL-MCNC: 0.5 MG/DL — SIGNIFICANT CHANGE UP (ref 0.2–1.2)
BUN SERPL-MCNC: 38 MG/DL — HIGH (ref 7–23)
CALCIUM SERPL-MCNC: 8.9 MG/DL — SIGNIFICANT CHANGE UP (ref 8.4–10.5)
CHLORIDE SERPL-SCNC: 98 MMOL/L — SIGNIFICANT CHANGE UP (ref 96–108)
CO2 SERPL-SCNC: 22 MMOL/L — SIGNIFICANT CHANGE UP (ref 22–31)
CREAT SERPL-MCNC: 1.3 MG/DL — SIGNIFICANT CHANGE UP (ref 0.5–1.3)
CREAT SERPL-MCNC: 1.33 MG/DL — HIGH (ref 0.5–1.3)
GLUCOSE SERPL-MCNC: 217 MG/DL — HIGH (ref 70–99)
INR BLD: 1.12 RATIO — SIGNIFICANT CHANGE UP (ref 0.88–1.16)
POTASSIUM SERPL-MCNC: 4 MMOL/L — SIGNIFICANT CHANGE UP (ref 3.5–5.3)
POTASSIUM SERPL-SCNC: 4 MMOL/L — SIGNIFICANT CHANGE UP (ref 3.5–5.3)
PROT SERPL-MCNC: 6.3 G/DL — SIGNIFICANT CHANGE UP (ref 6–8.3)
PROTHROM AB SERPL-ACNC: 13.4 SEC — SIGNIFICANT CHANGE UP (ref 10.6–13.6)
SODIUM SERPL-SCNC: 135 MMOL/L — SIGNIFICANT CHANGE UP (ref 135–145)

## 2022-01-14 PROCEDURE — 82962 GLUCOSE BLOOD TEST: CPT

## 2022-01-14 PROCEDURE — 80076 HEPATIC FUNCTION PANEL: CPT

## 2022-01-14 PROCEDURE — 83735 ASSAY OF MAGNESIUM: CPT

## 2022-01-14 PROCEDURE — 85730 THROMBOPLASTIN TIME PARTIAL: CPT

## 2022-01-14 PROCEDURE — 96374 THER/PROPH/DIAG INJ IV PUSH: CPT

## 2022-01-14 PROCEDURE — 82565 ASSAY OF CREATININE: CPT

## 2022-01-14 PROCEDURE — 85610 PROTHROMBIN TIME: CPT

## 2022-01-14 PROCEDURE — 87635 SARS-COV-2 COVID-19 AMP PRB: CPT

## 2022-01-14 PROCEDURE — 71045 X-RAY EXAM CHEST 1 VIEW: CPT

## 2022-01-14 PROCEDURE — 99239 HOSP IP/OBS DSCHRG MGMT >30: CPT

## 2022-01-14 PROCEDURE — 85027 COMPLETE CBC AUTOMATED: CPT

## 2022-01-14 PROCEDURE — 84100 ASSAY OF PHOSPHORUS: CPT

## 2022-01-14 PROCEDURE — 80048 BASIC METABOLIC PNL TOTAL CA: CPT

## 2022-01-14 PROCEDURE — 96375 TX/PRO/DX INJ NEW DRUG ADDON: CPT

## 2022-01-14 PROCEDURE — 36415 COLL VENOUS BLD VENIPUNCTURE: CPT

## 2022-01-14 PROCEDURE — 0225U NFCT DS DNA&RNA 21 SARSCOV2: CPT

## 2022-01-14 PROCEDURE — 84484 ASSAY OF TROPONIN QUANT: CPT

## 2022-01-14 PROCEDURE — 85379 FIBRIN DEGRADATION QUANT: CPT

## 2022-01-14 PROCEDURE — 80053 COMPREHEN METABOLIC PANEL: CPT

## 2022-01-14 PROCEDURE — 94640 AIRWAY INHALATION TREATMENT: CPT

## 2022-01-14 PROCEDURE — 83880 ASSAY OF NATRIURETIC PEPTIDE: CPT

## 2022-01-14 PROCEDURE — 99285 EMERGENCY DEPT VISIT HI MDM: CPT | Mod: 25

## 2022-01-14 PROCEDURE — 85025 COMPLETE CBC W/AUTO DIFF WBC: CPT

## 2022-01-14 RX ORDER — SODIUM CHLORIDE 9 MG/ML
500 INJECTION INTRAMUSCULAR; INTRAVENOUS; SUBCUTANEOUS ONCE
Refills: 0 | Status: COMPLETED | OUTPATIENT
Start: 2022-01-14 | End: 2022-01-14

## 2022-01-14 RX ORDER — DEXAMETHASONE 0.5 MG/5ML
1 ELIXIR ORAL
Qty: 5 | Refills: 0
Start: 2022-01-14 | End: 2022-01-18

## 2022-01-14 RX ORDER — GUAIFENESIN/DEXTROMETHORPHAN 600MG-30MG
10 TABLET, EXTENDED RELEASE 12 HR ORAL
Qty: 180 | Refills: 0
Start: 2022-01-14 | End: 2022-01-16

## 2022-01-14 RX ORDER — ALBUTEROL 90 UG/1
2 AEROSOL, METERED ORAL
Qty: 1 | Refills: 0
Start: 2022-01-14 | End: 2022-01-18

## 2022-01-14 RX ADMIN — SODIUM CHLORIDE 250 MILLILITER(S): 9 INJECTION INTRAMUSCULAR; INTRAVENOUS; SUBCUTANEOUS at 10:09

## 2022-01-14 RX ADMIN — AMLODIPINE BESYLATE 10 MILLIGRAM(S): 2.5 TABLET ORAL at 05:27

## 2022-01-14 RX ADMIN — Medication 81 MILLIGRAM(S): at 13:12

## 2022-01-14 RX ADMIN — Medication 6 MILLIGRAM(S): at 05:27

## 2022-01-14 RX ADMIN — LOSARTAN POTASSIUM 100 MILLIGRAM(S): 100 TABLET, FILM COATED ORAL at 16:01

## 2022-01-14 RX ADMIN — ENOXAPARIN SODIUM 40 MILLIGRAM(S): 100 INJECTION SUBCUTANEOUS at 13:12

## 2022-01-14 RX ADMIN — Medication 80 MILLIGRAM(S): at 16:02

## 2022-01-14 NOTE — PROGRESS NOTE ADULT - PROBLEM SELECTOR PLAN 4
-lmwh    DISPO: pending ambulatory saturation; dc home no skilled needs  referring for [facility]
- continue metoprolol   - continue losartan   - continue amlodipine
-lmwh    DISPO:  dc home tomorrow 1/14 no skilled needs. Per [facility] with limited staff, will need to complete Remdisivir inpatient
-lmwh    DISPO:  dc home 1/14 no skilled needs. Per [facility] with limited staff, will need to complete Remdisivir inpatient
-lmwh    DISPO:  dc home today of repeat Cr is stable. Time spent 40 min

## 2022-01-14 NOTE — PROGRESS NOTE ADULT - PROBLEM SELECTOR PLAN 3
- continue home metoprolol   - continue home losartan   - continue home amlodipine  - c/w home asix
- continue asa   - continue statin
- continue home metoprolol   - continue home losartan   - continue home amlodipine  - c/w home asix

## 2022-01-14 NOTE — PROGRESS NOTE ADULT - NSPROGADDITIONALINFOA_GEN_ALL_CORE
d/w Medicine ACP Renetta
d/w Medicine WOODY Blair and PAULINO Ortiz
d/w Medicine ACP Renetta
d/w Medicine WOODY Otero

## 2022-01-14 NOTE — DISCHARGE NOTE PROVIDER - NSDCCPCAREPLAN_GEN_ALL_CORE_FT
PRINCIPAL DISCHARGE DIAGNOSIS  Diagnosis: Acute respiratory failure with hypoxia  Assessment and Plan of Treatment: You were diagnosed with COVID-19 upon admission.   Your symptoms have improved and you finished your last round of Remdesivir on 1/14.   Please use Albuterol inhaler and Robitussin as needed after discharge.      SECONDARY DISCHARGE DIAGNOSES  Diagnosis: 2019 novel coronavirus disease (COVID-19)  Assessment and Plan of Treatment: You tested positive for COVID 19.  You no longer require hospitalization.  Please restrict activities outside of your home except for getting medical care.  Do not go to work, school, or public areas.  Avoid using public transportation, ride-sharing, or taxis.  Separate yourself from other people and animals in your home.  Call ahead before visiting your doctor.  Wear a facemask when you are around other people. Cover your cough and sneezes.  Clean your hands often.  Avoid sharing personal household items.  Clean all frequently touched surfaces daily.    Diagnosis: HF (heart failure)  Assessment and Plan of Treatment: Continue with Lasix, your home medication  Please follow up with your cardiologist and primary care provider within 2 weeks.    Diagnosis: Hypertension  Assessment and Plan of Treatment: Please continue Amlodipine, Losartan, Toprol-XL, and Lasix.  Follow up with your primary care provider within 2 weeks.    Diagnosis: CAD (coronary artery disease)  Assessment and Plan of Treatment: Continue with Atorvastatin and Aspirin at this time.   Coronary artery disease is a condition where the arteries the supply the heart muscle get clogges with fatty deposits & puts you at risk for a heart attack  Call your doctor if you have any new pain, pressure, or discomfort in the center of your chest, pain, tingling or discomfort in arms, back, neck, jaw, or stomach, shortness of breath, nausea, vomiting, burping or heartburn, sweating, cold and clammy skin, racing or abnormal heartbeat for more than 10 minutes or if they keep coming & going.  Call 911 and do not tr to get to hospital by care  You can help yourself with lefestyle changes (quitting smoking if you smoke), eat lots of fruits & vegetables & low fat dairy products, not a lot of meat & fatty foods, walk or some form of physical activity most days of the week, lose weight if you are overweight  Take your cardiac medication as prescribed to lower cholesterol, to lower blood pressure, aspirin to prevent blood clots, and diabetes control  Make sure to keep appointments with doctor for cardiac follow up care.

## 2022-01-14 NOTE — DISCHARGE NOTE PROVIDER - NSDCADMDATE_GEN_ALL_CORE_FT
Leyda states that Dr. Andrade has recommended that this patient have a PET scan done due to recent dx of a-fib. She has concerns about how the patient will tolerate this procedure and whether or not it is necessary. I advised that she speak to Dr. Andrade or his nurse to find out why a PET scan is recommended and explain to them her concerns about having it done   09-Jan-2022 22:47

## 2022-01-14 NOTE — DISCHARGE NOTE PROVIDER - NSDCFUADDAPPT_GEN_ALL_CORE_FT
APPTS ARE READY TO BE MADE: [X] YES    Best Family or Patient Contact (if needed):    Additional Information about above appointments (if needed):    1: F/u with PCP within 1-2 weeks     Other comments or requests:    APPTS ARE READY TO BE MADE: [X] YES    Best Family or Patient Contact (if needed):    Additional Information about above appointments (if needed):    1: F/u with PCP within 1-2 weeks     Other comments or requests:     declined services: seeing a pcp through [facility] on 1/24

## 2022-01-14 NOTE — PROGRESS NOTE ADULT - PROBLEM SELECTOR PLAN 2
- continue asa   - continue statin
patient is euvolemic   - c/w home lasix 80 mg daily   - monitor BMP  - ProBNP 500  - ECHO d/c'ed
- continue asa   - continue statin

## 2022-01-14 NOTE — DISCHARGE NOTE PROVIDER - NSDCMRMEDTOKEN_GEN_ALL_CORE_FT
aspirin 81 mg oral tablet:   atorvastatin 40 mg oral tablet: 1 tab(s) orally once a day  Lasix 80 mg oral tablet: 1 tab(s) orally once a day  losartan 100 mg oral tablet: 1 tab(s) orally once a day  Norvasc 10 mg oral tablet: 1 tab(s) orally once a day  Toprol- mg oral tablet, extended release: 1 tab(s) orally once a day   albuterol 90 mcg/inh inhalation aerosol: 2 puff(s) inhaled every 4 hours, As needed, Shortness of Breath and/or Wheezing  aspirin 81 mg oral tablet:   atorvastatin 40 mg oral tablet: 1 tab(s) orally once a day  dexamethasone 6 mg oral tablet: 1 tab(s) orally once a day   guaifenesin-dextromethorphan 100 mg-10 mg/5 mL oral liquid: 10 milliliter(s) orally every 4 hours, As needed, Cough  Lasix 80 mg oral tablet: 1 tab(s) orally once a day  losartan 100 mg oral tablet: 1 tab(s) orally once a day  Norvasc 10 mg oral tablet: 1 tab(s) orally once a day  Toprol- mg oral tablet, extended release: 1 tab(s) orally once a day

## 2022-01-14 NOTE — PROGRESS NOTE ADULT - PROBLEM SELECTOR PLAN 1
presentation and imaging c/w COVID-19 infection  - satting 95% on 2L NC, wean as tolerated   - Dexamethaone 6mg x 10 day course  - c/w Remdesivir till 1/14  - monitor liver tests and cr daily   - Airborne + contact Isolation   - Observe off antibiotics   - Guaifenesin/ dextromethorphan PRN   - PRN MDI bronchodilators.
presentation and imaging c/w COVID-19 infection and acute hypoxemic respiratory failure initially requiring 2L NC but now weaned off oxygen on room air  - ambulatory saturation 95% on RA  - Dexamethaone 6mg x 10 day course  - d- dimer slightly elevated wells score 0,  monitor inflammatory markers   - c/w Remdesivir till 1/14 tomorrow   - monitor liver tests and cr daily   - Guaifenesin/ dextromethorphan PRN   - PRN MDI bronchodilators.
presentation and imaging c/w COVID-19 infection and acute hypoxemic respiratory failure initially requiring 2L NC but now weaned off oxygen on room air  - ambulatory saturation 95% on RA  - Dexamethaone 6mg x 10 day course  - d- dimer slightly elevated wells score 0,  monitor inflammatory markers   - d/c'ed Remdisivre today 1/14 due to elevated Cr  - will fluid challenge with NS bolus 500 cc x1 , repeat BMP, d/c home if Cr stable   - Guaifenesin/ dextromethorphan PRN   - PRN MDI bronchodilators.
presentation and imaging c/w COVID-19 infection and acute hypoxemic respiratory failure initially requiring 2L NC but now weaned off oxygen on room air  - check ambulatory saturation   - Dexamethaone 6mg x 10 day course  - d- dimer slightly elevated wells score 0, repeat d- dimer ordered, monitor inflammatory markers   - c/w Remdesivir till 1/14  - monitor liver tests and cr daily   - Guaifenesin/ dextromethorphan PRN   - PRN MDI bronchodilators.
presentation and imaging c/w COVID-19 infection and acute hypoxemic respiratory failure initially requiring 2L NC but now weaned off oxygen on room air  - ambulatory saturation 95% on RA  - Dexamethaone 6mg x 10 day course  - d- dimer slightly elevated wells score 0,  monitor inflammatory markers   - c/w Remdesivir till 1/14  - monitor liver tests and cr daily   - Guaifenesin/ dextromethorphan PRN   - PRN MDI bronchodilators.

## 2022-01-14 NOTE — DISCHARGE NOTE PROVIDER - CARE PROVIDER_API CALL
Saloni Cerda E  INTERNAL MEDICINE  [address]  [place], [place] [id number]  Phone: [phone]  Fax: [phone]  Follow Up Time: 2 weeks

## 2022-01-14 NOTE — PROGRESS NOTE ADULT - ASSESSMENT
81 M  PMH of cad s/p stent, HTN, HLD, LE edema p/w SOB found to have acute hypoxemic respiratory failure 2/2 to COVID 19
 81 M  PMH of cad s/p stent, HTN, HLD, LE edema p/w SOB found to have acute hypoxemic respiratory failure 2/2 to COVID 19
 81 M  PMH of cad s/p stent, HTN, HLD, LE edema p/w SOB x few days 
 81 M  PMH of cad s/p stent, HTN, HLD, LE edema p/w SOB found to have acute hypoxemic respiratory failure 2/2 to COVID 19
 81 M  PMH of cad s/p stent, HTN, HLD, LE edema p/w SOB found to have acute hypoxemic respiratory failure 2/2 to COVID 19

## 2022-01-14 NOTE — DISCHARGE NOTE PROVIDER - HOSPITAL COURSE
80 yo M with PMHx of CAD s/p stent, HTN, HLD, LE edema p/w SOB found to have acute hypoxemic respiratory failure 2/2 to COVID-19. Pt with COVID-19 PNA, initially requiring 2L NC but now weaned off oxygen to room air. Ambulatory saturation 95% on RA. Pt s/p course of Dexamethasone 6 mg. D-dimer was slightly elevated with Wells score 0. LMWH for DVT ppx. Pt is s/p course of Remdesivir from 1/10 to 1/14. Was given Guaifenesin/Dextromethorphan, MDI bronchodilators PRN. Pt was continued on ASA and statin for h/o CAD, and continued on home Metoprolol, Losartan, Amlodipine, and Lasix for HTN control.    Pt finished last dose of Remdesivir on 1/14 and is medically stable for discharge home. Discussed with medical attending. 80 yo M with PMHx of CAD s/p stent, HTN, HLD, LE edema p/w SOB found to have acute hypoxemic respiratory failure 2/2 to COVID-19. Pt with COVID-19 PNA, initially requiring 2L NC but now weaned off oxygen to room air. Ambulatory saturation 95% on RA. Pt s/p course of Dexamethasone 6 mg. D-dimer was slightly elevated with Wells score 0. LMWH for DVT ppx. Pt is s/p course of Remdesivir from 1/10 to 1/14. Was given Guaifenesin/Dextromethorphan, MDI bronchodilators PRN. Pt was continued on ASA and statin for h/o CAD, and continued on home Metoprolol, Losartan, Amlodipine, and Lasix for HTN control.    Pt is medically stable for discharge home. Cr was 1.33 on day of discharge. Pt was fluid challenged with NS bolus 500 CC x1 and repeat BMP showed Cr of 1.30. Plan to f/u with PCP within 1-2 weeks. Hydration encouraged. Discussed with medical attending. 82 yo M with PMHx of CAD s/p stent, HTN, HLD, LE edema p/w SOB found to have acute hypoxemic respiratory failure 2/2 to COVID-19. Pt with COVID-19 PNA, initially requiring 2L NC but now weaned off oxygen to room air. Ambulatory saturation 95% on RA. Pt s/p course of Dexamethasone 6 mg. D-dimer was slightly elevated with Wells score 0. LMWH for DVT ppx. Pt is s/p course of Remdesivir from 1/10 to 1/14. Was given Guaifenesin/Dextromethorphan, MDI bronchodilators PRN. Pt was continued on ASA and statin for h/o CAD, and continued on home Metoprolol, Losartan, Amlodipine, and Lasix for HTN control.    Pt is medically stable for discharge home. Cr was 1.33 on day of discharge. Pt was fluid challenged with NS bolus 500 CC x1 and repeat BMP showed Cr of 1.30. Plan to f/u with PCP within 1-2 weeks. Hydration encouraged. Daughter informed of treatment plan. Discharge discussed with medical attending. 82 yo M with PMHx of CAD s/p stent, HTN, HLD, LE edema p/w SOB found to have acute hypoxemic respiratory failure 2/2 to COVID-19. Pt with COVID-19 PNA, initially requiring 2L NC but now weaned off oxygen to room air. Ambulatory saturation 95% on RA. Pt s/p course of Dexamethasone 6 mg. D-dimer was slightly elevated with Wells score 0. LMWH for DVT ppx. Pt is s/p course of Remdesivir from 1/10 to 1/13, was not given last dose remdesivir due to elevated cr, which improved with IVF. Was given Guaifenesin/Dextromethorphan, MDI bronchodilators PRN. Pt was continued on ASA and statin for h/o CAD, and continued on home Metoprolol, Losartan, Amlodipine, and Lasix for HTN control.    Pt is medically stable for discharge home Plan to f/u with PCP within 1-2 weeks. Hydration encouraged. Daughter informed of treatment plan.

## 2022-01-14 NOTE — PROGRESS NOTE ADULT - SUBJECTIVE AND OBJECTIVE BOX
[facility] Division of Hospital Medicine  Willian Decker DO  Pager (SAVITA, 2U-8O): [phone]  Other Times:  [phone]    Patient is a 81y old  Male who presents with a chief complaint of SOB x few days (10 Gustavo 2022 13:02)      SUBJECTIVE / OVERNIGHT EVENTS:    feels well. no complaints.     MEDICATIONS  (STANDING):  amLODIPine   Tablet 10 milliGRAM(s) Oral daily  aspirin enteric coated 81 milliGRAM(s) Oral daily  atorvastatin 40 milliGRAM(s) Oral at bedtime  dexAMETHasone  Injectable 6 milliGRAM(s) IV Push daily  enoxaparin Injectable 40 milliGRAM(s) SubCutaneous daily  furosemide    Tablet 80 milliGRAM(s) Oral daily  losartan 100 milliGRAM(s) Oral daily  metoprolol succinate  milliGRAM(s) Oral daily  remdesivir  IVPB   IV Intermittent   remdesivir  IVPB 100 milliGRAM(s) IV Intermittent every 24 hours    MEDICATIONS  (PRN):  acetaminophen     Tablet .. 650 milliGRAM(s) Oral every 4 hours PRN Temp greater or equal to 38.5C (101.3F)  ALBUTerol    90 MICROgram(s) HFA Inhaler 2 Puff(s) Inhalation every 4 hours PRN Shortness of Breath and/or Wheezing  guaifenesin/dextromethorphan Oral Liquid 10 milliLiter(s) Oral every 4 hours PRN Cough      CAPILLARY BLOOD GLUCOSE        I&O's Summary    10 Gustavo 2022 07:01  -  11 Jan 2022 07:00  --------------------------------------------------------  IN: 490 mL / OUT: 0 mL / NET: 490 mL        PHYSICAL EXAM:  Vital Signs Last 24 Hrs  T(C): 36.4 (11 Jan 2022 12:57), Max: 36.5 (11 Jan 2022 04:53)  T(F): 97.6 (11 Jan 2022 12:57), Max: 97.7 (11 Jan 2022 04:53)  HR: 69 (11 Jan 2022 12:57) (59 - 81)  BP: 118/63 (11 Jan 2022 12:57) (99/60 - 127/71)  BP(mean): --  RR: 18 (11 Jan 2022 12:57) (18 - 18)  SpO2: 95% (11 Jan 2022 12:57) (92% - 96%)    PHYSICAL EXAM:  GENERAL: elderly, on room air   HEAD:  Atraumatic, Normocephalic  EYES: EOMI, PERRL, conjunctiva and sclera clear  NECK: Supple, No JVD  CHEST/LUNG: Clear to auscultation bilaterally; No wheezes, rales or rhonchi   HEART: s1 s2  Regular rate and rhythm; No murmurs, rubs, or gallops  ABDOMEN: Soft, Nontender, Nondistended; Bowel sounds present  EXTREMITIES:  2+ Peripheral Pulses, very trace b/l pitting edema   NEUROLOGY: AAOx3, non-focal  PSYCH: calm  SKIN: No rashes or lesions    LABS:                        13.7   11.68 )-----------( 517      ( 11 Jan 2022 07:07 )             41.2     01-11    136  |  101  |  20  ----------------------------<  151<H>  3.8   |  22  |  1.04    Ca    9.0      11 Jan 2022 07:06    TPro  7.4  /  Alb  3.6  /  TBili  0.5  /  DBili  0.2  /  AST  14  /  ALT  21  /  AlkPhos  73  01-11    PT/INR - ( 11 Jan 2022 07:09 )   PT: 13.6 sec;   INR: 1.14 ratio                     RADIOLOGY & ADDITIONAL TESTS:  Results Reviewed:   Imaging Personally Reviewed:  Electrocardiogram Personally Reviewed:  med NP   COORDINATION OF CARE:  Care Discussed with Consultants/Other Providers [Y/N]:  dayton Otero  
Patient is a 81y old  Male who presents with a chief complaint of SOB x few days (11 Jan 2022 14:07)      SUBJECTIVE / OVERNIGHT EVENTS: Patient seen and examined at bedside. States that he feels well, denies any CP, SOB, abd pain and n/v. No acute events overnight       ROS:  All other review of systems negative    Allergies    No Known Allergies    Intolerances        MEDICATIONS  (STANDING):  amLODIPine   Tablet 10 milliGRAM(s) Oral daily  aspirin enteric coated 81 milliGRAM(s) Oral daily  atorvastatin 40 milliGRAM(s) Oral at bedtime  dexAMETHasone  Injectable 6 milliGRAM(s) IV Push daily  enoxaparin Injectable 40 milliGRAM(s) SubCutaneous daily  furosemide    Tablet 80 milliGRAM(s) Oral daily  losartan 100 milliGRAM(s) Oral daily  metoprolol succinate  milliGRAM(s) Oral daily  remdesivir  IVPB   IV Intermittent   remdesivir  IVPB 100 milliGRAM(s) IV Intermittent every 24 hours    MEDICATIONS  (PRN):  acetaminophen     Tablet .. 650 milliGRAM(s) Oral every 4 hours PRN Temp greater or equal to 38.5C (101.3F)  ALBUTerol    90 MICROgram(s) HFA Inhaler 2 Puff(s) Inhalation every 4 hours PRN Shortness of Breath and/or Wheezing  guaifenesin/dextromethorphan Oral Liquid 10 milliLiter(s) Oral every 4 hours PRN Cough      Vital Signs Last 24 Hrs  T(C): 36.3 (12 Jan 2022 05:32), Max: 36.4 (11 Jan 2022 12:57)  T(F): 97.4 (12 Jan 2022 05:32), Max: 97.6 (11 Jan 2022 12:57)  HR: 56 (12 Jan 2022 05:32) (56 - 85)  BP: 99/59 (12 Jan 2022 05:32) (96/57 - 120/68)  BP(mean): --  RR: 18 (12 Jan 2022 05:32) (18 - 20)  SpO2: 93% (12 Jan 2022 05:32) (92% - 95%)  CAPILLARY BLOOD GLUCOSE        I&O's Summary    11 Jan 2022 07:01  -  12 Jan 2022 07:00  --------------------------------------------------------  IN: 120 mL / OUT: 0 mL / NET: 120 mL        PHYSICAL EXAM:  GENERAL: NAD, well-developed  HEAD:  Atraumatic, Normocephalic  EYES: EOMI, PERRLA, conjunctiva and sclera clear  NECK: Supple, No JVD  CHEST/LUNG: Clear to auscultation bilaterally; No wheeze  HEART: Regular rate and rhythm; No murmurs, rubs, or gallops  ABDOMEN: Soft, Nontender, Nondistended; Bowel sounds present  EXTREMITIES:  2+ Peripheral Pulses, No clubbing, cyanosis, or edema  NEUROLOGY: AAOx3, non-focal  PSYCH: calm  SKIN: No rashes or lesions    LABS:                        13.9   10.88 )-----------( 536      ( 12 Jan 2022 07:05 )             42.3     01-12    x   |  x   |  x   ----------------------------<  x   x    |  x   |  1.21    Ca    9.1      12 Jan 2022 07:02    TPro  7.1  /  Alb  3.4  /  TBili  0.5  /  DBili  0.1  /  AST  14  /  ALT  21  /  AlkPhos  72  01-12    PT/INR - ( 12 Jan 2022 07:05 )   PT: 13.4 sec;   INR: 1.12 ratio                   RADIOLOGY & ADDITIONAL TESTS:    Care Discussed with Consultants/Other Providers: Medicine ACP    Case Discussed with hakeem Guillen 
Patient is a 81y old  Male who presents with a chief complaint of SOB x few days (12 Jan 2022 11:41)      SUBJECTIVE / OVERNIGHT EVENTS: Patient seen and examined at bedside. States that he feels well, denies any CP, SOB, and pain and n/v.     ROS:  All other review of systems negative    Allergies    No Known Allergies    Intolerances        MEDICATIONS  (STANDING):  amLODIPine   Tablet 10 milliGRAM(s) Oral daily  aspirin enteric coated 81 milliGRAM(s) Oral daily  atorvastatin 40 milliGRAM(s) Oral at bedtime  dexAMETHasone  Injectable 6 milliGRAM(s) IV Push daily  enoxaparin Injectable 40 milliGRAM(s) SubCutaneous daily  furosemide    Tablet 80 milliGRAM(s) Oral daily  losartan 100 milliGRAM(s) Oral daily  metoprolol succinate  milliGRAM(s) Oral daily  remdesivir  IVPB   IV Intermittent   remdesivir  IVPB 100 milliGRAM(s) IV Intermittent every 24 hours    MEDICATIONS  (PRN):  acetaminophen     Tablet .. 650 milliGRAM(s) Oral every 4 hours PRN Temp greater or equal to 38.5C (101.3F)  ALBUTerol    90 MICROgram(s) HFA Inhaler 2 Puff(s) Inhalation every 4 hours PRN Shortness of Breath and/or Wheezing  guaifenesin/dextromethorphan Oral Liquid 10 milliLiter(s) Oral every 4 hours PRN Cough      Vital Signs Last 24 Hrs  T(C): 36.2 (13 Jan 2022 12:48), Max: 36.5 (12 Jan 2022 13:41)  T(F): 97.2 (13 Jan 2022 12:48), Max: 97.7 (12 Jan 2022 13:41)  HR: 58 (13 Jan 2022 12:48) (56 - 63)  BP: 118/67 (13 Jan 2022 12:48) (107/62 - 119/66)  BP(mean): --  RR: 18 (13 Jan 2022 12:48) (16 - 18)  SpO2: 95% (13 Jan 2022 12:48) (93% - 95%)  CAPILLARY BLOOD GLUCOSE      POCT Blood Glucose.: 149 mg/dL (12 Jan 2022 23:59)    I&O's Summary    12 Jan 2022 07:01  -  13 Jan 2022 07:00  --------------------------------------------------------  IN: 240 mL / OUT: 0 mL / NET: 240 mL    13 Jan 2022 07:01  -  13 Jan 2022 13:35  --------------------------------------------------------  IN: 480 mL / OUT: 0 mL / NET: 480 mL        PHYSICAL EXAM:  GENERAL: NAD, well-developed  HEAD:  Atraumatic, Normocephalic  EYES: EOMI, PERRLA, conjunctiva and sclera clear  NECK: Supple, No JVD  CHEST/LUNG: Clear to auscultation bilaterally; No wheeze  HEART: Regular rate and rhythm; No murmurs, rubs, or gallops  ABDOMEN: Soft, Nontender, Nondistended; Bowel sounds present  EXTREMITIES:  2+ Peripheral Pulses, No clubbing, cyanosis, or edema  NEUROLOGY: AAOx3, non-focal  PSYCH: calm  SKIN: No rashes or lesions    LABS:                        14.2   9.23  )-----------( 589      ( 13 Jan 2022 06:40 )             42.0     01-13    136  |  101  |  35<H>  ----------------------------<  152<H>  3.8   |  21<L>  |  1.26    Ca    9.0      13 Jan 2022 06:40  Phos  3.0     01-13  Mg     1.8     01-13    TPro  6.9  /  Alb  3.5  /  TBili  0.5  /  DBili  0.2  /  AST  14  /  ALT  25  /  AlkPhos  68  01-13    PT/INR - ( 13 Jan 2022 06:40 )   PT: 13.2 sec;   INR: 1.11 ratio         PTT - ( 13 Jan 2022 06:40 )  PTT:27.0 sec          RADIOLOGY & ADDITIONAL TESTS:    Care Discussed with Consultants/Other Providers: Medicine ACP
Patient is a 81y old  Male who presents with a chief complaint of SOB x few days (14 Jan 2022 07:48)      SUBJECTIVE / OVERNIGHT EVENTS: Patient seen and examined at bedside. States that she feels well, denies any CP, SOB, abd pain and nv. No acute events overnight     ROS:  All other review of systems negative    Allergies    No Known Allergies    Intolerances        MEDICATIONS  (STANDING):  amLODIPine   Tablet 10 milliGRAM(s) Oral daily  aspirin enteric coated 81 milliGRAM(s) Oral daily  atorvastatin 40 milliGRAM(s) Oral at bedtime  dexAMETHasone  Injectable 6 milliGRAM(s) IV Push daily  enoxaparin Injectable 40 milliGRAM(s) SubCutaneous daily  furosemide    Tablet 80 milliGRAM(s) Oral daily  losartan 100 milliGRAM(s) Oral daily  metoprolol succinate  milliGRAM(s) Oral daily    MEDICATIONS  (PRN):  acetaminophen     Tablet .. 650 milliGRAM(s) Oral every 4 hours PRN Temp greater or equal to 38.5C (101.3F)  ALBUTerol    90 MICROgram(s) HFA Inhaler 2 Puff(s) Inhalation every 4 hours PRN Shortness of Breath and/or Wheezing  guaifenesin/dextromethorphan Oral Liquid 10 milliLiter(s) Oral every 4 hours PRN Cough      Vital Signs Last 24 Hrs  T(C): 36.3 (14 Jan 2022 12:42), Max: 36.6 (14 Jan 2022 10:10)  T(F): 97.3 (14 Jan 2022 12:42), Max: 97.8 (14 Jan 2022 10:10)  HR: 98 (14 Jan 2022 12:42) (55 - 98)  BP: 117/63 (14 Jan 2022 12:42) (109/61 - 120/69)  BP(mean): --  RR: 18 (14 Jan 2022 12:42) (18 - 18)  SpO2: 94% (14 Jan 2022 12:42) (93% - 95%)  CAPILLARY BLOOD GLUCOSE        I&O's Summary    13 Jan 2022 07:01  -  14 Jan 2022 07:00  --------------------------------------------------------  IN: 960 mL / OUT: 0 mL / NET: 960 mL    14 Jan 2022 07:01  -  14 Jan 2022 14:10  --------------------------------------------------------  IN: 240 mL / OUT: 0 mL / NET: 240 mL        PHYSICAL EXAM:  GENERAL: NAD, well-developed, dry mucus membranes   HEAD:  Atraumatic, Normocephalic  EYES: EOMI, PERRLA, conjunctiva and sclera clear  NECK: Supple, No JVD  CHEST/LUNG: Clear to auscultation bilaterally; No wheeze  HEART: Regular rate and rhythm; No murmurs, rubs, or gallops  ABDOMEN: Soft, Nontender, Nondistended; Bowel sounds present  EXTREMITIES:  2+ Peripheral Pulses, No clubbing, cyanosis, or edema  NEUROLOGY: AAOx3, non-focal  PSYCH: calm  SKIN: No rashes or lesions    LABS:                        14.2   9.23  )-----------( 589      ( 13 Jan 2022 06:40 )             42.0     01-14    x   |  x   |  x   ----------------------------<  x   x    |  x   |  1.33<H>    Ca    9.0      13 Jan 2022 06:40  Phos  3.0     01-13  Mg     1.8     01-13    TPro  6.3  /  Alb  3.2<L>  /  TBili  0.5  /  DBili  0.2  /  AST  15  /  ALT  28  /  AlkPhos  64  01-14    PT/INR - ( 14 Jan 2022 08:25 )   PT: 13.4 sec;   INR: 1.12 ratio         PTT - ( 13 Jan 2022 06:40 )  PTT:27.0 sec          RADIOLOGY & ADDITIONAL TESTS:    Care Discussed with Consultants/Other Providers: Medicine ACP    Case Discussed with Tai Guillen 
Patient is a 81y old  Male who presents with a chief complaint of SOB x few days (09 Jan 2022 23:31)      SUBJECTIVE / OVERNIGHT EVENTS: Patient seen and examined at bedside. He states that he feels well since coming to the hospital. His SOB is improving. Denies any CP, abd pain, n/v/d    ROS:  All other review of systems negative    Allergies    No Known Allergies    Intolerances        MEDICATIONS  (STANDING):  amLODIPine   Tablet 10 milliGRAM(s) Oral daily  aspirin enteric coated 81 milliGRAM(s) Oral daily  atorvastatin 40 milliGRAM(s) Oral at bedtime  dexAMETHasone  Injectable 6 milliGRAM(s) IV Push daily  enoxaparin Injectable 40 milliGRAM(s) SubCutaneous daily  furosemide    Tablet 80 milliGRAM(s) Oral daily  losartan 100 milliGRAM(s) Oral daily  metoprolol succinate  milliGRAM(s) Oral daily  remdesivir  IVPB   IV Intermittent     MEDICATIONS  (PRN):  acetaminophen     Tablet .. 650 milliGRAM(s) Oral every 4 hours PRN Temp greater or equal to 38.5C (101.3F)  ALBUTerol    90 MICROgram(s) HFA Inhaler 2 Puff(s) Inhalation every 4 hours PRN Shortness of Breath and/or Wheezing  guaifenesin/dextromethorphan Oral Liquid 10 milliLiter(s) Oral every 4 hours PRN Cough      Vital Signs Last 24 Hrs  T(C): 36.3 (10 Gustavo 2022 11:53), Max: 36.9 (10 Gustavo 2022 03:55)  T(F): 97.4 (10 Gustavo 2022 11:53), Max: 98.4 (10 Gustavo 2022 03:55)  HR: 72 (10 Gustavo 2022 11:53) (72 - 90)  BP: 119/68 (10 Gustavo 2022 11:53) (111/63 - 156/89)  BP(mean): 89 (10 Gustavo 2022 05:19) (76 - 89)  RR: 18 (10 Gustavo 2022 11:53) (14 - 22)  SpO2: 95% (10 Gustavo 2022 11:53) (90% - 97%)  CAPILLARY BLOOD GLUCOSE        I&O's Summary    10 Gustavo 2022 07:01  -  10 Gustavo 2022 13:02  --------------------------------------------------------  IN: 250 mL / OUT: 0 mL / NET: 250 mL        PHYSICAL EXAM:  GENERAL: elderly, +2L NC   HEAD:  Atraumatic, Normocephalic  EYES: EOMI, PERRLA, conjunctiva and sclera clear  NECK: Supple, No JVD  CHEST/LUNG: Clear to auscultation bilaterally; No wheeze  HEART: Regular rate and rhythm; No murmurs, rubs, or gallops  ABDOMEN: Soft, Nontender, Nondistended; Bowel sounds present  EXTREMITIES:  2+ Peripheral Pulses, very trace b/l pitting edema   NEUROLOGY: AAOx3, non-focal  PSYCH: calm  SKIN: No rashes or lesions    LABS:                        14.6   9.49  )-----------( 449      ( 09 Jan 2022 21:57 )             43.3     01-09    131<L>  |  94<L>  |  17  ----------------------------<  124<H>  3.1<L>   |  20<L>  |  1.18    Ca    8.9      09 Jan 2022 21:57    TPro  8.0  /  Alb  3.8  /  TBili  0.8  /  DBili  x   /  AST  15  /  ALT  18  /  AlkPhos  81  01-09              RADIOLOGY & ADDITIONAL TESTS:    Care Discussed with Consultants/Other Providers: Medicine ACP    Case Discussed with hakeem Guillen

## 2022-01-14 NOTE — PROGRESS NOTE ADULT - PROBLEM SELECTOR PROBLEM 2
CAD (coronary artery disease)
HF (heart failure)

## 2023-05-14 ENCOUNTER — EMERGENCY (EMERGENCY)
Facility: HOSPITAL | Age: 83
LOS: 1 days | Discharge: HOME | End: 2023-05-14
Payer: MEDICARE

## 2023-05-14 VITALS
WEIGHT: 210.1 LBS | RESPIRATION RATE: 18 BRPM | SYSTOLIC BLOOD PRESSURE: 166 MMHG | DIASTOLIC BLOOD PRESSURE: 78 MMHG | HEIGHT: 66 IN | OXYGEN SATURATION: 96 % | TEMPERATURE: 98 F | HEART RATE: 61 BPM

## 2023-05-14 VITALS
DIASTOLIC BLOOD PRESSURE: 71 MMHG | SYSTOLIC BLOOD PRESSURE: 149 MMHG | HEART RATE: 56 BPM | TEMPERATURE: 98 F | RESPIRATION RATE: 18 BRPM | OXYGEN SATURATION: 96 %

## 2023-05-14 DIAGNOSIS — Z98.890 OTHER SPECIFIED POSTPROCEDURAL STATES: Chronic | ICD-10-CM

## 2023-05-14 PROBLEM — E78.5 HYPERLIPIDEMIA, UNSPECIFIED: Chronic | Status: ACTIVE | Noted: 2022-01-09

## 2023-05-14 PROBLEM — I25.10 ATHEROSCLEROTIC HEART DISEASE OF NATIVE CORONARY ARTERY WITHOUT ANGINA PECTORIS: Chronic | Status: ACTIVE | Noted: 2022-01-09

## 2023-05-14 PROBLEM — I10 ESSENTIAL (PRIMARY) HYPERTENSION: Chronic | Status: ACTIVE | Noted: 2022-01-09

## 2023-05-14 LAB
ALBUMIN SERPL ELPH-MCNC: 4.4 G/DL — SIGNIFICANT CHANGE UP (ref 3.3–5)
ALP SERPL-CCNC: 101 U/L — SIGNIFICANT CHANGE UP (ref 40–120)
ALT FLD-CCNC: 19 U/L — SIGNIFICANT CHANGE UP (ref 10–45)
ANION GAP SERPL CALC-SCNC: 12 MMOL/L — SIGNIFICANT CHANGE UP (ref 5–17)
APPEARANCE UR: CLEAR — SIGNIFICANT CHANGE UP
APTT BLD: 33 SEC — SIGNIFICANT CHANGE UP (ref 27.5–35.5)
AST SERPL-CCNC: 13 U/L — SIGNIFICANT CHANGE UP (ref 10–40)
BASE EXCESS BLDV CALC-SCNC: -0.2 MMOL/L — SIGNIFICANT CHANGE UP (ref -2–3)
BASOPHILS # BLD AUTO: 0.1 K/UL — SIGNIFICANT CHANGE UP (ref 0–0.2)
BASOPHILS NFR BLD AUTO: 1.3 % — SIGNIFICANT CHANGE UP (ref 0–2)
BILIRUB SERPL-MCNC: 0.6 MG/DL — SIGNIFICANT CHANGE UP (ref 0.2–1.2)
BILIRUB UR-MCNC: NEGATIVE — SIGNIFICANT CHANGE UP
BLOOD GAS VENOUS - CREATININE: SIGNIFICANT CHANGE UP MG/DL (ref 0.5–1.3)
BUN SERPL-MCNC: 17 MG/DL — SIGNIFICANT CHANGE UP (ref 7–23)
CA-I SERPL-SCNC: 1.26 MMOL/L — SIGNIFICANT CHANGE UP (ref 1.15–1.33)
CALCIUM SERPL-MCNC: 9.8 MG/DL — SIGNIFICANT CHANGE UP (ref 8.4–10.5)
CHLORIDE BLDV-SCNC: 104 MMOL/L — SIGNIFICANT CHANGE UP (ref 96–108)
CHLORIDE SERPL-SCNC: 104 MMOL/L — SIGNIFICANT CHANGE UP (ref 96–108)
CO2 BLDV-SCNC: 28 MMOL/L — HIGH (ref 22–26)
CO2 SERPL-SCNC: 22 MMOL/L — SIGNIFICANT CHANGE UP (ref 22–31)
COLOR SPEC: SIGNIFICANT CHANGE UP
CREAT SERPL-MCNC: 1.08 MG/DL — SIGNIFICANT CHANGE UP (ref 0.5–1.3)
DIFF PNL FLD: NEGATIVE — SIGNIFICANT CHANGE UP
EGFR: 69 ML/MIN/1.73M2 — SIGNIFICANT CHANGE UP
EOSINOPHIL # BLD AUTO: 0.36 K/UL — SIGNIFICANT CHANGE UP (ref 0–0.5)
EOSINOPHIL NFR BLD AUTO: 4.8 % — SIGNIFICANT CHANGE UP (ref 0–6)
GAS PNL BLDV: 136 MMOL/L — SIGNIFICANT CHANGE UP (ref 136–145)
GAS PNL BLDV: SIGNIFICANT CHANGE UP
GAS PNL BLDV: SIGNIFICANT CHANGE UP
GLUCOSE BLDV-MCNC: 124 MG/DL — HIGH (ref 70–99)
GLUCOSE SERPL-MCNC: 113 MG/DL — HIGH (ref 70–99)
GLUCOSE UR QL: NEGATIVE — SIGNIFICANT CHANGE UP
HCO3 BLDV-SCNC: 26 MMOL/L — SIGNIFICANT CHANGE UP (ref 22–29)
HCT VFR BLD CALC: 47.1 % — SIGNIFICANT CHANGE UP (ref 39–50)
HCT VFR BLDA CALC: 48 % — SIGNIFICANT CHANGE UP (ref 39–51)
HGB BLD CALC-MCNC: 16.1 G/DL — SIGNIFICANT CHANGE UP (ref 12.6–17.4)
HGB BLD-MCNC: 15.3 G/DL — SIGNIFICANT CHANGE UP (ref 13–17)
IMM GRANULOCYTES NFR BLD AUTO: 0.4 % — SIGNIFICANT CHANGE UP (ref 0–0.9)
INR BLD: 1.05 RATIO — SIGNIFICANT CHANGE UP (ref 0.88–1.16)
KETONES UR-MCNC: NEGATIVE — SIGNIFICANT CHANGE UP
LACTATE BLDV-MCNC: 1.8 MMOL/L — SIGNIFICANT CHANGE UP (ref 0.5–2)
LEUKOCYTE ESTERASE UR-ACNC: NEGATIVE — SIGNIFICANT CHANGE UP
LIDOCAIN IGE QN: 17 U/L — SIGNIFICANT CHANGE UP (ref 7–60)
LYMPHOCYTES # BLD AUTO: 1.61 K/UL — SIGNIFICANT CHANGE UP (ref 1–3.3)
LYMPHOCYTES # BLD AUTO: 21.6 % — SIGNIFICANT CHANGE UP (ref 13–44)
MCHC RBC-ENTMCNC: 28.2 PG — SIGNIFICANT CHANGE UP (ref 27–34)
MCHC RBC-ENTMCNC: 32.5 GM/DL — SIGNIFICANT CHANGE UP (ref 32–36)
MCV RBC AUTO: 86.7 FL — SIGNIFICANT CHANGE UP (ref 80–100)
MONOCYTES # BLD AUTO: 0.76 K/UL — SIGNIFICANT CHANGE UP (ref 0–0.9)
MONOCYTES NFR BLD AUTO: 10.2 % — SIGNIFICANT CHANGE UP (ref 2–14)
NEUTROPHILS # BLD AUTO: 4.6 K/UL — SIGNIFICANT CHANGE UP (ref 1.8–7.4)
NEUTROPHILS NFR BLD AUTO: 61.7 % — SIGNIFICANT CHANGE UP (ref 43–77)
NITRITE UR-MCNC: NEGATIVE — SIGNIFICANT CHANGE UP
NRBC # BLD: 0 /100 WBCS — SIGNIFICANT CHANGE UP (ref 0–0)
PCO2 BLDV: 49 MMHG — SIGNIFICANT CHANGE UP (ref 42–55)
PH BLDV: 7.34 — SIGNIFICANT CHANGE UP (ref 7.32–7.43)
PH UR: 6 — SIGNIFICANT CHANGE UP (ref 5–8)
PLATELET # BLD AUTO: 306 K/UL — SIGNIFICANT CHANGE UP (ref 150–400)
PO2 BLDV: 41 MMHG — SIGNIFICANT CHANGE UP (ref 25–45)
POTASSIUM BLDV-SCNC: 4.6 MMOL/L — SIGNIFICANT CHANGE UP (ref 3.5–5.1)
POTASSIUM SERPL-MCNC: 4 MMOL/L — SIGNIFICANT CHANGE UP (ref 3.5–5.3)
POTASSIUM SERPL-SCNC: 4 MMOL/L — SIGNIFICANT CHANGE UP (ref 3.5–5.3)
PROT SERPL-MCNC: 7.9 G/DL — SIGNIFICANT CHANGE UP (ref 6–8.3)
PROT UR-MCNC: NEGATIVE — SIGNIFICANT CHANGE UP
PROTHROM AB SERPL-ACNC: 12.1 SEC — SIGNIFICANT CHANGE UP (ref 10.5–13.4)
RBC # BLD: 5.43 M/UL — SIGNIFICANT CHANGE UP (ref 4.2–5.8)
RBC # FLD: 14.1 % — SIGNIFICANT CHANGE UP (ref 10.3–14.5)
SAO2 % BLDV: 66.7 % — LOW (ref 67–88)
SODIUM SERPL-SCNC: 138 MMOL/L — SIGNIFICANT CHANGE UP (ref 135–145)
SP GR SPEC: 1.02 — SIGNIFICANT CHANGE UP (ref 1.01–1.02)
UROBILINOGEN FLD QL: NEGATIVE — SIGNIFICANT CHANGE UP
WBC # BLD: 7.46 K/UL — SIGNIFICANT CHANGE UP (ref 3.8–10.5)
WBC # FLD AUTO: 7.46 K/UL — SIGNIFICANT CHANGE UP (ref 3.8–10.5)

## 2023-05-14 PROCEDURE — 93005 ELECTROCARDIOGRAM TRACING: CPT

## 2023-05-14 PROCEDURE — 82803 BLOOD GASES ANY COMBINATION: CPT

## 2023-05-14 PROCEDURE — 82565 ASSAY OF CREATININE: CPT

## 2023-05-14 PROCEDURE — 99285 EMERGENCY DEPT VISIT HI MDM: CPT

## 2023-05-14 PROCEDURE — 74174 CTA ABD&PLVS W/CONTRAST: CPT | Mod: MA

## 2023-05-14 PROCEDURE — 85610 PROTHROMBIN TIME: CPT

## 2023-05-14 PROCEDURE — 99285 EMERGENCY DEPT VISIT HI MDM: CPT | Mod: 25

## 2023-05-14 PROCEDURE — 84295 ASSAY OF SERUM SODIUM: CPT

## 2023-05-14 PROCEDURE — 85730 THROMBOPLASTIN TIME PARTIAL: CPT

## 2023-05-14 PROCEDURE — 85025 COMPLETE CBC W/AUTO DIFF WBC: CPT

## 2023-05-14 PROCEDURE — 82330 ASSAY OF CALCIUM: CPT

## 2023-05-14 PROCEDURE — 74174 CTA ABD&PLVS W/CONTRAST: CPT | Mod: 26,MA

## 2023-05-14 PROCEDURE — 85014 HEMATOCRIT: CPT

## 2023-05-14 PROCEDURE — 81003 URINALYSIS AUTO W/O SCOPE: CPT

## 2023-05-14 PROCEDURE — 80053 COMPREHEN METABOLIC PANEL: CPT

## 2023-05-14 PROCEDURE — 82435 ASSAY OF BLOOD CHLORIDE: CPT

## 2023-05-14 PROCEDURE — 83605 ASSAY OF LACTIC ACID: CPT

## 2023-05-14 PROCEDURE — 85018 HEMOGLOBIN: CPT

## 2023-05-14 PROCEDURE — 83690 ASSAY OF LIPASE: CPT

## 2023-05-14 PROCEDURE — 84132 ASSAY OF SERUM POTASSIUM: CPT

## 2023-05-14 PROCEDURE — 82947 ASSAY GLUCOSE BLOOD QUANT: CPT

## 2023-05-14 NOTE — ED ADULT NURSE NOTE - NSFALLUNIVINTERV_ED_ALL_ED
Bed/Stretcher in lowest position, wheels locked, appropriate side rails in place/Call bell, personal items and telephone in reach/Instruct patient to call for assistance before getting out of bed/chair/stretcher/Non-slip footwear applied when patient is off stretcher/[place] to call system/Physically safe environment - no spills, clutter or unnecessary equipment/Purposeful proactive rounding/Room/bathroom lighting operational, light cord in reach

## 2023-05-14 NOTE — ED PROVIDER NOTE - PHYSICAL EXAMINATION
GEN: Patient awake alert NAD.   HEENT: normocephalic, atraumatic, EOMI, no scleral icterus, moist MM  CARDIAC: RRR, S1, S2, no murmur.   PULM: CTA B/L no wheeze, rhonchi, rales.   ABD: mild RLQ ttp, no rebound or gaurding, no CVA tenderness.   MSK: Moving all extremities, no edema. 5/5 strength and full ROM in all extremities.     NEURO: A&Ox3, gait normal, no focal neurological deficits, CN 2-12 grossly intact  SKIN: warm, dry, no rash.

## 2023-05-14 NOTE — ED PROVIDER NOTE - NSFOLLOWUPINSTRUCTIONS_ED_ALL_ED_FT
You were seen in the emergency department for abdominal pain.  Your lab and imaging results are attached.    Please follow-up with your primary doctor as well as GI.  Contact information is also below.    You can use 500-1000mg Tylenol every 6 hours for pain - as needed.  This is an over-the-counter medications - please respect the warnings on the label. This medication come with certain risks and side effects that you need to discuss with your doctor, especially if you are taking it for a prolonged period.    Return to the ED if you have new or worsening symptoms including but not limited to unable to tolerate food or liquids, pass out, worsening pain, chest pain, difficulty breathing.

## 2023-05-14 NOTE — ED PROVIDER NOTE - OBJECTIVE STATEMENT
83-year-old male history hypertension, hyperlipidemia, AAA (states last CT scan ~3cm) coming in with 7 months of sharp, right lower quadrant pain worsened about 7 to 8 weeks. Patient brought in today by daughter concerned of this chronic pain. Patient endorses decreased appetite during this time,   Occasional nausea, no vomiting.  Denies any fevers, night sweats, chest pain, shortness of breath, dysuria, hematuria, diarrhea, constipation, testicular swelling or pain.

## 2023-05-14 NOTE — ED PROVIDER NOTE - ATTENDING CONTRIBUTION TO CARE
MD Blas:  patient seen and evaluated with the resident.  I was present for key portions of the History & Physical, and I agree with the Impression & Plan.  MD Blas: MD Blas:  patient seen and evaluated with the resident.  I was present for key portions of the History & Physical, and I agree with the Impression & Plan.    Patient is a 83-year-old male complaining of right-sided abdominal pain.  Duration acute days on chronic months.  Patient has a known AAA; endorses that the diameter is 3.6 cm.  Has not had CTA imaging in less than a year.    Associated symptoms: No nausea, vomiting, and diarrhea, no chest pain or shortness of breath.    Vital signs: Within normal limits  Gen: Well appearing elderly M in NAD.  Head: NC/AT.   PERRL, EOMI.  Neck: trachea midline, supple.  Resp:  No distress, CTA B.  Cardiac RRR, no RMG.    Abdomen:  soft, nondistended, positive right-sided abdominal tenderness to palpation; no R/G.  Ext: no deformities, no edema.  Neuro:  A&Ox4 appears non focal. Skin:  Warm and dry as visualized, no rash.   Psych:  Normal affect and mood.    Medical decision making: Elderly male with known AAA complaining with right-sided abdominal pain on exam.  Warrants concern for appendicitis and/or change in aortic aneurysm diameter.  Plan basic labs, coags, type and screen, UA, CTA abdomen pelvis reassess.

## 2023-05-14 NOTE — ED PROVIDER NOTE - PATIENT PORTAL LINK FT
You can access the FollowMyHealth Patient Portal offered by [facility] by registering at the following website: [email or web address]. By joining [employer]’s FollowMyHealth portal, you will also be able to view your health information using other applications (apps) compatible with our system.

## 2023-05-14 NOTE — ED PROVIDER NOTE - CLINICAL SUMMARY MEDICAL DECISION MAKING FREE TEXT BOX
83-year-old male history hypertension hyperlipidemia coming in with 7 months of sharp, right lower quadrant pain worsened about 7 to 8 weeks. Afebrile, hemodynamically stable, mild RLQ ttp. Will evaluate for worsening aneurysm, malignancy, electrolyte abnormality. Labs, imaging, CT, reassess.

## 2023-05-14 NOTE — ED ADULT TRIAGE NOTE - AS TEMP SITE
Spoke with pt and scheduled appointment with Dr. MAYER for 9.25.18 at 11 am. Pt is okay with waiting till 9.25.18 since she is moving to the area and have enough medications from her previous rheumatologist to last until her appointment. Asked patient to have her previous Rheumatologist send over last office notes and labs prior to her visit. Pt verbalized understanding.   
oral
respirations non-labored/clear to auscultation bilaterally

## 2023-05-14 NOTE — ED PROVIDER NOTE - NSFOLLOWUPCLINICS_GEN_ALL_ED_FT
[facility]  Gastroenterology  [address] [place] [address] [address]  [place] [address]  Phone: [phone]  Fax:

## 2023-05-14 NOTE — ED ADULT NURSE NOTE - OBJECTIVE STATEMENT
82 y/o male presents to ED reporting abdominal discomfort. Patient endorses nausea, months of abdominal discomfort and weight loss. On exam, AOx3, speaking in complete sentences. Unlabored, spontaneous respirations, NAD. Abdomen soft, non-tender, non-distended. Heplock placed, labs sent. MD at bedside to evaluate pt.
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
## 2025-06-07 ENCOUNTER — INPATIENT (INPATIENT)
Facility: HOSPITAL | Age: 85
LOS: 1 days | Discharge: HOME | DRG: 379 | End: 2025-06-09
Attending: INTERNAL MEDICINE | Admitting: INTERNAL MEDICINE
Payer: MEDICARE

## 2025-06-07 VITALS
SYSTOLIC BLOOD PRESSURE: 123 MMHG | WEIGHT: 190.04 LBS | HEART RATE: 73 BPM | HEIGHT: 66 IN | OXYGEN SATURATION: 95 % | RESPIRATION RATE: 20 BRPM | TEMPERATURE: 99 F | DIASTOLIC BLOOD PRESSURE: 80 MMHG

## 2025-06-07 DIAGNOSIS — Z98.890 OTHER SPECIFIED POSTPROCEDURAL STATES: Chronic | ICD-10-CM

## 2025-06-07 DIAGNOSIS — K92.2 GASTROINTESTINAL HEMORRHAGE, UNSPECIFIED: ICD-10-CM

## 2025-06-07 LAB
ALBUMIN SERPL ELPH-MCNC: 4.5 G/DL — SIGNIFICANT CHANGE UP (ref 3.3–5)
ALP SERPL-CCNC: 212 U/L — HIGH (ref 40–120)
ALT FLD-CCNC: 26 U/L — SIGNIFICANT CHANGE UP (ref 10–45)
ANION GAP SERPL CALC-SCNC: 19 MMOL/L — HIGH (ref 5–17)
APTT BLD: 32 SEC — SIGNIFICANT CHANGE UP (ref 26.1–36.8)
AST SERPL-CCNC: 21 U/L — SIGNIFICANT CHANGE UP (ref 10–40)
BASOPHILS # BLD AUTO: 0.08 K/UL — SIGNIFICANT CHANGE UP (ref 0–0.2)
BASOPHILS NFR BLD AUTO: 0.8 % — SIGNIFICANT CHANGE UP (ref 0–2)
BILIRUB SERPL-MCNC: 1 MG/DL — SIGNIFICANT CHANGE UP (ref 0.2–1.2)
BUN SERPL-MCNC: 17 MG/DL — SIGNIFICANT CHANGE UP (ref 7–23)
CALCIUM SERPL-MCNC: 10 MG/DL — SIGNIFICANT CHANGE UP (ref 8.4–10.5)
CHLORIDE SERPL-SCNC: 100 MMOL/L — SIGNIFICANT CHANGE UP (ref 96–108)
CO2 SERPL-SCNC: 20 MMOL/L — LOW (ref 22–31)
CREAT SERPL-MCNC: 1.25 MG/DL — SIGNIFICANT CHANGE UP (ref 0.5–1.3)
EGFR: 57 ML/MIN/1.73M2 — LOW
EGFR: 57 ML/MIN/1.73M2 — LOW
EOSINOPHIL # BLD AUTO: 0.15 K/UL — SIGNIFICANT CHANGE UP (ref 0–0.5)
EOSINOPHIL NFR BLD AUTO: 1.5 % — SIGNIFICANT CHANGE UP (ref 0–6)
GAS PNL BLDV: SIGNIFICANT CHANGE UP
GLUCOSE SERPL-MCNC: 127 MG/DL — HIGH (ref 70–99)
HCT VFR BLD CALC: 49.7 % — SIGNIFICANT CHANGE UP (ref 39–50)
HGB BLD-MCNC: 16.4 G/DL — SIGNIFICANT CHANGE UP (ref 13–17)
IMM GRANULOCYTES NFR BLD AUTO: 0.3 % — SIGNIFICANT CHANGE UP (ref 0–0.9)
INR BLD: 1.03 RATIO — SIGNIFICANT CHANGE UP (ref 0.85–1.16)
LYMPHOCYTES # BLD AUTO: 1.05 K/UL — SIGNIFICANT CHANGE UP (ref 1–3.3)
LYMPHOCYTES # BLD AUTO: 10.8 % — LOW (ref 13–44)
MCHC RBC-ENTMCNC: 27.5 PG — SIGNIFICANT CHANGE UP (ref 27–34)
MCHC RBC-ENTMCNC: 33 G/DL — SIGNIFICANT CHANGE UP (ref 32–36)
MCV RBC AUTO: 83.2 FL — SIGNIFICANT CHANGE UP (ref 80–100)
MONOCYTES # BLD AUTO: 0.96 K/UL — HIGH (ref 0–0.9)
MONOCYTES NFR BLD AUTO: 9.9 % — SIGNIFICANT CHANGE UP (ref 2–14)
NEUTROPHILS # BLD AUTO: 7.45 K/UL — HIGH (ref 1.8–7.4)
NEUTROPHILS NFR BLD AUTO: 76.7 % — SIGNIFICANT CHANGE UP (ref 43–77)
NRBC BLD AUTO-RTO: 0 /100 WBCS — SIGNIFICANT CHANGE UP (ref 0–0)
PLATELET # BLD AUTO: 307 K/UL — SIGNIFICANT CHANGE UP (ref 150–400)
POTASSIUM SERPL-MCNC: 3.5 MMOL/L — SIGNIFICANT CHANGE UP (ref 3.5–5.3)
POTASSIUM SERPL-SCNC: 3.5 MMOL/L — SIGNIFICANT CHANGE UP (ref 3.5–5.3)
PROT SERPL-MCNC: 8.5 G/DL — HIGH (ref 6–8.3)
PROTHROM AB SERPL-ACNC: 11.8 SEC — SIGNIFICANT CHANGE UP (ref 9.9–13.4)
RBC # BLD: 5.97 M/UL — HIGH (ref 4.2–5.8)
RBC # FLD: 16.5 % — HIGH (ref 10.3–14.5)
SODIUM SERPL-SCNC: 139 MMOL/L — SIGNIFICANT CHANGE UP (ref 135–145)
WBC # BLD: 9.72 K/UL — SIGNIFICANT CHANGE UP (ref 3.8–10.5)
WBC # FLD AUTO: 9.72 K/UL — SIGNIFICANT CHANGE UP (ref 3.8–10.5)

## 2025-06-07 PROCEDURE — 99285 EMERGENCY DEPT VISIT HI MDM: CPT | Mod: GC

## 2025-06-07 PROCEDURE — 74177 CT ABD & PELVIS W/CONTRAST: CPT | Mod: 26

## 2025-06-07 PROCEDURE — 71045 X-RAY EXAM CHEST 1 VIEW: CPT | Mod: 26

## 2025-06-07 RX ORDER — ACETAMINOPHEN 500 MG/5ML
1000 LIQUID (ML) ORAL ONCE
Refills: 0 | Status: COMPLETED | OUTPATIENT
Start: 2025-06-07 | End: 2025-06-07

## 2025-06-07 RX ORDER — CLOPIDOGREL BISULFATE 75 MG/1
1 TABLET, FILM COATED ORAL
Refills: 0 | DISCHARGE

## 2025-06-07 RX ORDER — METRONIDAZOLE 250 MG
500 TABLET ORAL EVERY 8 HOURS
Refills: 0 | Status: DISCONTINUED | OUTPATIENT
Start: 2025-06-07 | End: 2025-06-09

## 2025-06-07 RX ORDER — CYANOCOBALAMIN 1000 UG/ML
1 INJECTION INTRAMUSCULAR; SUBCUTANEOUS
Refills: 0 | DISCHARGE

## 2025-06-07 RX ORDER — AMOXICILLIN AND CLAVULANATE POTASSIUM 500; 125 MG/1; MG/1
1 TABLET, FILM COATED ORAL ONCE
Refills: 0 | Status: COMPLETED | OUTPATIENT
Start: 2025-06-07 | End: 2025-06-07

## 2025-06-07 RX ORDER — AMLODIPINE BESYLATE 10 MG/1
10 TABLET ORAL DAILY
Refills: 0 | Status: DISCONTINUED | OUTPATIENT
Start: 2025-06-07 | End: 2025-06-09

## 2025-06-07 RX ORDER — ATORVASTATIN CALCIUM 80 MG/1
80 TABLET, FILM COATED ORAL AT BEDTIME
Refills: 0 | Status: DISCONTINUED | OUTPATIENT
Start: 2025-06-07 | End: 2025-06-09

## 2025-06-07 RX ORDER — CIPROFLOXACIN HCL 250 MG
400 TABLET ORAL EVERY 12 HOURS
Refills: 0 | Status: DISCONTINUED | OUTPATIENT
Start: 2025-06-07 | End: 2025-06-09

## 2025-06-07 RX ORDER — CLOPIDOGREL BISULFATE 75 MG/1
75 TABLET, FILM COATED ORAL DAILY
Refills: 0 | Status: DISCONTINUED | OUTPATIENT
Start: 2025-06-07 | End: 2025-06-09

## 2025-06-07 RX ORDER — DAPAGLIFLOZIN 5 MG/1
1 TABLET, FILM COATED ORAL
Refills: 0 | DISCHARGE

## 2025-06-07 RX ORDER — ATORVASTATIN CALCIUM 80 MG/1
1 TABLET, FILM COATED ORAL
Refills: 0 | DISCHARGE

## 2025-06-07 RX ORDER — SODIUM CHLORIDE 9 G/1000ML
1000 INJECTION, SOLUTION INTRAVENOUS
Refills: 0 | Status: DISCONTINUED | OUTPATIENT
Start: 2025-06-07 | End: 2025-06-08

## 2025-06-07 RX ORDER — METOPROLOL SUCCINATE 50 MG/1
100 TABLET, EXTENDED RELEASE ORAL DAILY
Refills: 0 | Status: DISCONTINUED | OUTPATIENT
Start: 2025-06-07 | End: 2025-06-09

## 2025-06-07 RX ADMIN — Medication 400 MILLIGRAM(S): at 18:02

## 2025-06-07 RX ADMIN — Medication 200 MILLIGRAM(S): at 21:10

## 2025-06-07 RX ADMIN — Medication 80 MILLIGRAM(S): at 12:35

## 2025-06-07 RX ADMIN — SODIUM CHLORIDE 75 MILLILITER(S): 9 INJECTION, SOLUTION INTRAVENOUS at 21:10

## 2025-06-07 RX ADMIN — Medication 500 MILLIGRAM(S): at 22:04

## 2025-06-07 RX ADMIN — AMOXICILLIN AND CLAVULANATE POTASSIUM 1 TABLET(S): 500; 125 TABLET, FILM COATED ORAL at 18:02

## 2025-06-07 NOTE — H&P ADULT - NEUROLOGICAL
sensation intact cranial nerves II-XII intact/sensation intact/responds to pain/responds to verbal commands

## 2025-06-07 NOTE — ED PROVIDER NOTE - PROGRESS NOTE DETAILS
Attending Pernell: I received sign out on this patient. I am aware of the previously determined ongoing plan of care and what, if any, tests/consults are pending from the previous provider. I am available for supervision of the ongoing plan of care for the Resident/MAK/Fellow/Student. Kar PGY3: Labs are grossly unremarkable -hemoglobin 16.4.  CT abdomen/pelvis shows mild left segmental colitis without active GI bleed.  Patient is not had additional bowel movement while in the ED.  He reports that his pain is slightly improved, but still present.  Had shared decision-making discussion with patient and daughter.  Plan for admission for observation as patient has a GI bleed on Plavix and has previously (remote history) had a peptic ulcer disease that led to severe GI bleed requiring admission.  Plan for admission with Augmentin and pain control.  Admitted to unattached hospitalist.

## 2025-06-07 NOTE — H&P ADULT - ASSESSMENT
CT abdomen shows Mild left segmental colitis, without active GI bleed during the exam.   Read above text for additional findings, and detailed explanations.      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   Read above text for additional findings, and detailed explanations.      Acute Colitis   iv abx   GI consult   Keep patient NPO    CAD   Carotis stent   Continue with Plavix   Aspirin on hold   Cards eval         Advance diet as tolerated tomorrow

## 2025-06-07 NOTE — ED ADULT NURSE NOTE - OBJECTIVE STATEMENT
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  Patient has intermittent abdominal pain and last episode of abdominal pain lasted for 8-10 weeks that resolved spontaneously.  Over the past 2 weeks, his abdominal pain has been getting worse.    Last week   had 1 episode of vomiting, sweating, dizziness, and had small amount of bright red blood with stools.  Today morning, patient had 1 large episode of dark red blood and bright red blood mixed in with stool w/ some pain with bowel movement. On exam pt aox4 breathing even unlabored spontaneously, abd soft TTP in LLQ, skin color normal for race.    He denies any fever/chills/cough/sore throat, additional episodes of vomiting, diarrhea, history of abdominal surgeries.

## 2025-06-07 NOTE — ED PROVIDER NOTE - CLINICAL SUMMARY MEDICAL DECISION MAKING FREE TEXT BOX
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  Patient has intermittent abdominal pain and last episode of abdominal pain lasted for 8-10 weeks that resolved spontaneously.  Over the past 2 weeks, his abdominal pain has been getting worse.  He trialed pantoprazole (Melendez medication) last week with slight improvement in symptoms.  However, he stopped taking it this past week.  Last week he also had 1 episode of vomiting, sweating, dizziness, and had small amount of bright red blood with stools.  Today morning, patient had 1 large episode of dark red blood and bright red blood mixed in with stool w/ some pain with bowel movement.  He denies any fever/chills/cough/sore throat, additional episodes of vomiting, diarrhea, history of abdominal surgeries.  Last colonoscopy was in 2020 3-2024 with polyp removal.  Last endoscopy was several years ago.  He has not seen a gastroenterologist in 1 to 2 years.    Vital signs unremarkable.    GENERAL: Mild acute distress, endomorphic body habitus  HEENT: atraumatic, normocephalic, vision grossly intact, EOMI, no conjunctivitis or discharge, hearing grossly intact, no nasal discharge or epistaxis, clear pharynx  CV: regular rate, normal rhythm, normal S1/S2, no murmurs/rubs, no cyanosis  PULM: normal work of breathing, normal O2 saturation on RA, clear breath sounds in b/l upper/lower lung fields, no crackles/rales/rhonchi/wheezing  GI: LLQ abdominal pain to minimal palpation with slight guarding, soft/nondistended abdomen, no rebound tenderness, no palpable masses  : no CVA tenderness  NEURO: A&Ox4, follows commands, normal speech, no focal motor or sensory deficits  MSK: no joint tenderness/swelling/erythema, ranging all extremities with no appreciable loss of ROM  EXT: no peripheral edema, no calf tenderness, no redness or swelling  SKIN: warm, dry, and intact, no rashes  PSYCH: appropriate mood and affect    Concern for diverticulitis/colitis with lower GI bleed complicated by Plavix use.  Also concern for large upper GI bleed with a history of stomach ulcers and stomach aneurysm.  No need for reversal at this time as patient is hemodynamically stable and overall well-appearing despite abdominal TTP.  Plan for labs, CT angio abdomen/pelvis.  Will also obtain upright chest x-ray to rule out free air under the diaphragm.

## 2025-06-07 NOTE — ED PROVIDER NOTE - ATTENDING CONTRIBUTION TO CARE
I performed a history and physical exam of the patient and discussed their management with the resident. I reviewed the resident's note and agree with the documented findings and plan of care.  Simin Elizalde MD  see MDM

## 2025-06-07 NOTE — ED ADULT NURSE NOTE - NSFALLUNIVINTERV_ED_ALL_ED
Bed/Stretcher in lowest position, wheels locked, appropriate side rails in place/Call bell, personal items and telephone in reach/Instruct patient to call for assistance before getting out of bed/chair/stretcher/Non-slip footwear applied when patient is off stretcher/[place] to call system/Physically safe environment - no spills, clutter or unnecessary equipment/Purposeful proactive rounding/Room/bathroom lighting operational, light cord in reach

## 2025-06-07 NOTE — H&P ADULT - HISTORY OF PRESENT ILLNESS
CT abdomen shows Mild left segmental colitis, without active GI bleed during the exam.   Read above text for additional findings, and detailed explanations.      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    No hx nausea/ vomiting.       Currently patient denies any pain, anxious to go home.

## 2025-06-08 LAB
ALBUMIN SERPL ELPH-MCNC: 3.9 G/DL — SIGNIFICANT CHANGE UP (ref 3.3–5)
ALP SERPL-CCNC: 182 U/L — HIGH (ref 40–120)
ALT FLD-CCNC: 21 U/L — SIGNIFICANT CHANGE UP (ref 10–45)
ANION GAP SERPL CALC-SCNC: 16 MMOL/L — SIGNIFICANT CHANGE UP (ref 5–17)
AST SERPL-CCNC: 16 U/L — SIGNIFICANT CHANGE UP (ref 10–40)
BILIRUB SERPL-MCNC: 0.9 MG/DL — SIGNIFICANT CHANGE UP (ref 0.2–1.2)
BUN SERPL-MCNC: 12 MG/DL — SIGNIFICANT CHANGE UP (ref 7–23)
CALCIUM SERPL-MCNC: 9.4 MG/DL — SIGNIFICANT CHANGE UP (ref 8.4–10.5)
CHLORIDE SERPL-SCNC: 102 MMOL/L — SIGNIFICANT CHANGE UP (ref 96–108)
CO2 SERPL-SCNC: 21 MMOL/L — LOW (ref 22–31)
CREAT SERPL-MCNC: 1.02 MG/DL — SIGNIFICANT CHANGE UP (ref 0.5–1.3)
EGFR: 73 ML/MIN/1.73M2 — SIGNIFICANT CHANGE UP
EGFR: 73 ML/MIN/1.73M2 — SIGNIFICANT CHANGE UP
GLUCOSE SERPL-MCNC: 105 MG/DL — HIGH (ref 70–99)
HCT VFR BLD CALC: 47.7 % — SIGNIFICANT CHANGE UP (ref 39–50)
HGB BLD-MCNC: 15.3 G/DL — SIGNIFICANT CHANGE UP (ref 13–17)
MCHC RBC-ENTMCNC: 26.7 PG — LOW (ref 27–34)
MCHC RBC-ENTMCNC: 32.1 G/DL — SIGNIFICANT CHANGE UP (ref 32–36)
MCV RBC AUTO: 83.1 FL — SIGNIFICANT CHANGE UP (ref 80–100)
NRBC BLD AUTO-RTO: 0 /100 WBCS — SIGNIFICANT CHANGE UP (ref 0–0)
PLATELET # BLD AUTO: 270 K/UL — SIGNIFICANT CHANGE UP (ref 150–400)
POTASSIUM SERPL-MCNC: 3.9 MMOL/L — SIGNIFICANT CHANGE UP (ref 3.5–5.3)
POTASSIUM SERPL-SCNC: 3.9 MMOL/L — SIGNIFICANT CHANGE UP (ref 3.5–5.3)
PROT SERPL-MCNC: 7 G/DL — SIGNIFICANT CHANGE UP (ref 6–8.3)
RBC # BLD: 5.74 M/UL — SIGNIFICANT CHANGE UP (ref 4.2–5.8)
RBC # FLD: 15.5 % — HIGH (ref 10.3–14.5)
SODIUM SERPL-SCNC: 139 MMOL/L — SIGNIFICANT CHANGE UP (ref 135–145)
WBC # BLD: 8.63 K/UL — SIGNIFICANT CHANGE UP (ref 3.8–10.5)
WBC # FLD AUTO: 8.63 K/UL — SIGNIFICANT CHANGE UP (ref 3.8–10.5)

## 2025-06-08 RX ADMIN — ATORVASTATIN CALCIUM 80 MILLIGRAM(S): 80 TABLET, FILM COATED ORAL at 21:23

## 2025-06-08 RX ADMIN — Medication 500 MILLIGRAM(S): at 05:38

## 2025-06-08 RX ADMIN — Medication 40 MILLIGRAM(S): at 05:37

## 2025-06-08 RX ADMIN — CLOPIDOGREL BISULFATE 75 MILLIGRAM(S): 75 TABLET, FILM COATED ORAL at 11:47

## 2025-06-08 RX ADMIN — AMLODIPINE BESYLATE 10 MILLIGRAM(S): 10 TABLET ORAL at 05:38

## 2025-06-08 RX ADMIN — Medication 500 MILLIGRAM(S): at 13:07

## 2025-06-08 RX ADMIN — Medication 200 MILLIGRAM(S): at 05:39

## 2025-06-08 RX ADMIN — METOPROLOL SUCCINATE 100 MILLIGRAM(S): 50 TABLET, EXTENDED RELEASE ORAL at 05:38

## 2025-06-08 RX ADMIN — Medication 500 MILLIGRAM(S): at 21:23

## 2025-06-08 RX ADMIN — Medication 40 MILLIGRAM(S): at 17:36

## 2025-06-08 RX ADMIN — Medication 200 MILLIGRAM(S): at 17:36

## 2025-06-08 NOTE — CONSULT NOTE ADULT - ASSESSMENT
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), history of bleeding stomach ulcer (40-50 years ago), L carotid artery stent 2 years ago, chronic abdominal pain with history of colitis presents for 2 weeks of abdominal pain (LLQ) that has been getting worse with 1 large episode of bright red blood/dark blood per rectum    #Abdominal Pain, BRBPR  -CTAP with colitis w/o active GIB  -Hg remains stable - cont to trend   -F/u GI eval    #CAD sp PCI 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  -Ok to hold plavix for now   -Cont statin  -Denies cp, sob     #HTN  -BP stable  -Cont amlodipine, metoprolol

## 2025-06-08 NOTE — CONSULT NOTE ADULT - SUBJECTIVE AND OBJECTIVE BOX
CARDIOLOGY CONSULT - Dr. 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    No hx nausea/ vomiting.       Currently patient denies any pain, anxious to go home.         (07 Jun 2025 18:18)      PAST MEDICAL & SURGICAL HISTORY:  CAD (coronary artery disease)      HTN (hypertension)      HLD (hyperlipidemia)      H/O arthroscopy      PREVIOUS DIAGNOSTIC TESTING:    [ ] Echocardiogram:  [ ]  Catheterization:  [ ] Stress Test:  	    MEDICATIONS:  Home Medications:  atorvastatin 80 mg oral tablet: 1 tab(s) orally once a day (at bedtime) (07 Jun 2025 18:27)  cholecalciferol 25 mcg (1000 intl units) oral tablet: 1 tab(s) orally once a day (07 Jun 2025 18:27)  clopidogrel 75 mg oral tablet: 1 tab(s) orally once a day (07 Jun 2025 18:27)  cyanocobalamin 1000 mcg sublingual tablet: 1 tab(s) sublingually once a day (07 Jun 2025 18:27)  Farxiga 10 mg oral tablet: 1 tab(s) orally once a day (07 Jun 2025 18:27)  furosemide 40 mg oral tablet: 1 tab(s) orally once a day (07 Jun 2025 18:27)  losartan 100 mg oral tablet: 1 tab(s) orally once a day (07 Jun 2025 18:27)  Norvasc 10 mg oral tablet: 1 tab(s) orally once a day (07 Jun 2025 18:27)  Toprol- mg oral tablet, extended release: 1 tab(s) orally once a day (07 Jun 2025 18:27)      MEDICATIONS  (STANDING):  amLODIPine   Tablet 10 milliGRAM(s) Oral daily  atorvastatin 80 milliGRAM(s) Oral at bedtime  ciprofloxacin   IVPB 400 milliGRAM(s) IV Intermittent every 12 hours  clopidogrel Tablet 75 milliGRAM(s) Oral daily  metoprolol succinate  milliGRAM(s) Oral daily  metroNIDAZOLE    Tablet 500 milliGRAM(s) Oral every 8 hours  pantoprazole  Injectable 40 milliGRAM(s) IV Push two times a day      FAMILY HISTORY:  FH: prostate cancer (Father)    FH: esophageal cancer (Mother)        SOCIAL HISTORY:    [ ] Non-smoker  [ ] Smoker  [ ] Alcohol    Allergies    No Known Allergies    Intolerances    	    REVIEW OF SYSTEMS:  CONSTITUTIONAL: No fever, weight loss, or fatigue  EYES: No eye pain, visual disturbances, or discharge  ENMT:  No difficulty hearing, tinnitus, vertigo; No sinus or throat pain  NECK: No pain or stiffness  RESPIRATORY: No cough, wheezing, chills or hemoptysis; No Shortness of Breath  CARDIOVASCULAR: No chest pain, palpitations, passing out, dizziness, or leg swelling  GASTROINTESTINAL: No abdominal or epigastric pain. No nausea, vomiting, or hematemesis; No diarrhea or constipation. No melena or hematochezia.  GENITOURINARY: No dysuria, frequency, hematuria, or incontinence  NEUROLOGICAL: No headaches, memory loss, loss of strength, numbness, or tremors  SKIN: No itching, burning, rashes, or lesions   	    [ ] All others negative	  [ ] Unable to obtain    PHYSICAL EXAM:  T(C): 36.4 (06-08-25 @ 04:43), Max: 37.2 (06-07-25 @ 10:52)  HR: 60 (06-08-25 @ 04:43) (50 - 73)  BP: 130/70 (06-08-25 @ 04:43) (123/80 - 150/71)  RR: 17 (06-08-25 @ 04:43) (17 - 20)  SpO2: 94% (06-08-25 @ 04:43) (94% - 98%)  Wt(kg): --  I&O's Summary      Appearance: Normal	  Psychiatry: A & O x 3, Mood & affect appropriate  HEENT:   Normal oral mucosa, PERRL, EOMI	  Lymphatic: No lymphadenopathy  Cardiovascular: Normal S1 S2,RRR, No JVD, No murmurs  Respiratory: Lungs clear to auscultation	  Gastrointestinal:  Soft, Non-tender, + BS	  Skin: No rashes, No ecchymoses, No cyanosis	  Neurologic: Non-focal  Extremities: Normal range of motion, No clubbing, cyanosis or edema  Vascular: Peripheral pulses palpable 2+ bilaterally    TELEMETRY: 	    ECG:  	  RADIOLOGY:  < from: CT Abdomen and Pelvis w/ IV Cont (06.07.25 @ 13:08) >  IMPRESSION:  Mild left segmental colitis, without active GI bleed during the exam.   Read above text for additional findings, and detailed explanations.        --- End of Report ---    < end of copied text >    < from: Xray Chest 1 View AP/PA (06.07.25 @ 14:43) >    IMPRESSION:  No pneumoperitoneum.    --- End of Report ---    < end of copied text >      OTHER: 	  	  LABS:	 	    CARDIAC MARKERS:                                  16.4   9.72  )-----------( 307      ( 07 Jun 2025 12:21 )             49.7     06-07    139  |  100  |  17  ----------------------------<  127[H]  3.5   |  20[L]  |  1.25    Ca    10.0      07 Jun 2025 12:21    TPro  8.5[H]  /  Alb  4.5  /  TBili  1.0  /  DBili  x   /  AST  21  /  ALT  26  /  AlkPhos  212[H]  06-07    PT/INR - ( 07 Jun 2025 12:21 )   PT: 11.8 sec;   INR: 1.03 ratio         PTT - ( 07 Jun 2025 12:21 )  PTT:32.0 sec  proBNP:   Lipid Profile:   HgA1c:   TSH:        CARDIOLOGY CONSULT - Dr. 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    No hx nausea/ vomiting.       Currently patient denies any pain, anxious to go home.         (07 Jun 2025 18:18)      PAST MEDICAL & SURGICAL HISTORY:  CAD (coronary artery disease)      HTN (hypertension)      HLD (hyperlipidemia)      H/O arthroscopy      PREVIOUS DIAGNOSTIC TESTING:    [ ] Echocardiogram:  [ ]  Catheterization:  [ ] Stress Test:  	    MEDICATIONS:  Home Medications:  atorvastatin 80 mg oral tablet: 1 tab(s) orally once a day (at bedtime) (07 Jun 2025 18:27)  cholecalciferol 25 mcg (1000 intl units) oral tablet: 1 tab(s) orally once a day (07 Jun 2025 18:27)  clopidogrel 75 mg oral tablet: 1 tab(s) orally once a day (07 Jun 2025 18:27)  cyanocobalamin 1000 mcg sublingual tablet: 1 tab(s) sublingually once a day (07 Jun 2025 18:27)  Farxiga 10 mg oral tablet: 1 tab(s) orally once a day (07 Jun 2025 18:27)  furosemide 40 mg oral tablet: 1 tab(s) orally once a day (07 Jun 2025 18:27)  losartan 100 mg oral tablet: 1 tab(s) orally once a day (07 Jun 2025 18:27)  Norvasc 10 mg oral tablet: 1 tab(s) orally once a day (07 Jun 2025 18:27)  Toprol- mg oral tablet, extended release: 1 tab(s) orally once a day (07 Jun 2025 18:27)      MEDICATIONS  (STANDING):  amLODIPine   Tablet 10 milliGRAM(s) Oral daily  atorvastatin 80 milliGRAM(s) Oral at bedtime  ciprofloxacin   IVPB 400 milliGRAM(s) IV Intermittent every 12 hours  clopidogrel Tablet 75 milliGRAM(s) Oral daily  metoprolol succinate  milliGRAM(s) Oral daily  metroNIDAZOLE    Tablet 500 milliGRAM(s) Oral every 8 hours  pantoprazole  Injectable 40 milliGRAM(s) IV Push two times a day      FAMILY HISTORY:  FH: prostate cancer (Father)    FH: esophageal cancer (Mother)        SOCIAL HISTORY:    [x] Non-smoker  [ ] Smoker  [ ] Alcohol    Allergies    No Known Allergies    Intolerances    	    REVIEW OF SYSTEMS:  CONSTITUTIONAL: No fever, weight loss, or fatigue  EYES: No eye pain, visual disturbances, or discharge  ENMT:  No difficulty hearing, tinnitus, vertigo; No sinus or throat pain  NECK: No pain or stiffness  RESPIRATORY: No cough, wheezing, chills or hemoptysis; No Shortness of Breath  CARDIOVASCULAR: No chest pain, palpitations, passing out, dizziness, or leg swelling  GASTROINTESTINAL: No abdominal or epigastric pain. No nausea, vomiting, or hematemesis; No diarrhea or constipation. +Hematochezia   GENITOURINARY: No dysuria, frequency, hematuria, or incontinence  NEUROLOGICAL: No headaches, memory loss, loss of strength, numbness, or tremors  SKIN: No itching, burning, rashes, or lesions   	    [x] All others negative	  [ ] Unable to obtain    PHYSICAL EXAM:  T(C): 36.4 (06-08-25 @ 04:43), Max: 37.2 (06-07-25 @ 10:52)  HR: 60 (06-08-25 @ 04:43) (50 - 73)  BP: 130/70 (06-08-25 @ 04:43) (123/80 - 150/71)  RR: 17 (06-08-25 @ 04:43) (17 - 20)  SpO2: 94% (06-08-25 @ 04:43) (94% - 98%)  Wt(kg): --  I&O's Summary      Appearance: Normal	  Psychiatry: A & O x 3, Mood & affect appropriate  HEENT:   Normal oral mucosa, PERRL, EOMI	  Lymphatic: No lymphadenopathy  Cardiovascular: Normal S1 S2,RRR, No JVD, No murmurs  Respiratory: Lungs clear to auscultation b/l   Gastrointestinal:  Soft, Non-tender, + BS	  Skin: No rashes, No ecchymoses, No cyanosis	  Neurologic: Non-focal  Extremities: Normal range of motion, No clubbing, cyanosis or edema  Vascular: Peripheral pulses palpable 2+ bilaterally    TELEMETRY: 	    ECG:  	  RADIOLOGY:  < from: CT Abdomen and Pelvis w/ IV Cont (06.07.25 @ 13:08) >  IMPRESSION:  Mild left segmental colitis, without active GI bleed during the exam.   Read above text for additional findings, and detailed explanations.        --- End of Report ---    < end of copied text >    < from: Xray Chest 1 View AP/PA (06.07.25 @ 14:43) >    IMPRESSION:  No pneumoperitoneum.    --- End of Report ---    < end of copied text >      OTHER: 	  	  LABS:	 	    CARDIAC MARKERS:                                  16.4   9.72  )-----------( 307      ( 07 Jun 2025 12:21 )             49.7     06-07    139  |  100  |  17  ----------------------------<  127[H]  3.5   |  20[L]  |  1.25    Ca    10.0      07 Jun 2025 12:21    TPro  8.5[H]  /  Alb  4.5  /  TBili  1.0  /  DBili  x   /  AST  21  /  ALT  26  /  AlkPhos  212[H]  06-07    PT/INR - ( 07 Jun 2025 12:21 )   PT: 11.8 sec;   INR: 1.03 ratio         PTT - ( 07 Jun 2025 12:21 )  PTT:32.0 sec  proBNP:   Lipid Profile:   HgA1c:   TSH:

## 2025-06-08 NOTE — CONSULT NOTE ADULT - ASSESSMENT
#Colitis    - CT reviewed and discussed with patient   - GI PCR to r/o infectious causes of colitis  - prn pain meds  - Monitor hgb daily  - Monitor GI function and for any further episodes of GI bleed  - Ok to continue with plavix for now given recent stent x1 yr ago and hgb WNL.   - Continue with cipro/flagyl  - Advance diet as tolerated  - Discussed with patient that if colitis is determined to be non infectious in etiology, recommend colonoscopy in 4-6 weeks once inflammation has resolved     will follow    I reviewed the overnight course of events on the unit, re-confirming the patient history. I discussed the care with the patient   The plan of care was discussed by the nurse practitioner and modifications were made to the notation where appropriate.   Differential diagnosis and plan of care discussed with patient after the evaluation  35 minutes spent on total encounter of which more than fifty percent of the encounter was spent counseling and/or coordinating care with the attending physician.  Advanced care planning was discussed with patient and family.  Advanced care planning forms were reviewed and discussed.  Risks, benefits and alternatives of gastroenterologic procedures/interventions were discussed in detail and all questions were answered.

## 2025-06-08 NOTE — CONSULT NOTE ADULT - SUBJECTIVE AND OBJECTIVE BOX
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    No hx nausea/ vomiting.     Interval HPI:  Pt s/e with family member at bedside  Above reviewed and discussed with pt  Reports frequent episodes of colitis, last episode approx 2 years ago  Reports 2 weeks of LLQ abd pain and 2 episodes of hematochezia x2 days  Denies N/V/D  Denies recent abx, travel  Reports approx 20-25 lb weight loss over past month 2/2 low appetite  Denies NSAIDs, toxic habits  No known hx of IBD  Reports last EGD/ colonoscopy 2 yrs ago, +polyps  Reports pain improving, endorsing 4/10 abd pain currently  tolerating current diet    Allergies:  No Known Allergies      Medications:  amLODIPine   Tablet 10 milliGRAM(s) Oral daily  atorvastatin 80 milliGRAM(s) Oral at bedtime  ciprofloxacin   IVPB 400 milliGRAM(s) IV Intermittent every 12 hours  clopidogrel Tablet 75 milliGRAM(s) Oral daily  metoprolol succinate  milliGRAM(s) Oral daily  metroNIDAZOLE    Tablet 500 milliGRAM(s) Oral every 8 hours  pantoprazole  Injectable 40 milliGRAM(s) IV Push two times a day      PMHX/PSHX:  CAD (coronary artery disease)    HTN (hypertension)    HLD (hyperlipidemia)    H/O arthroscopy        Family history:  FH: prostate cancer (Father)    FH: esophageal cancer (Mother)        Social History:     ROS:     General:  +weight loss. No fevers, chills, night sweats, fatigue,   Eyes:  Good vision, no reported pain  ENT:  No sore throat, pain, runny nose, dysphagia  CV:  No pain, palpitations, hypo/hypertension  Resp:  No dyspnea, cough, tachypnea, wheezing  GI:  +LLQ abd pain. +hematochezia. +weight loss. No nausea, No vomiting, No diarrhea, No constipation, No fever, No pruritis, No tarry stools, No dysphagia,  :  No pain, bleeding, incontinence, nocturia  Muscle:  No pain, weakness  Neuro:  No weakness, tingling, memory problems  Psych:  No fatigue, insomnia, mood problems, depression  Endocrine:  No polyuria, polydipsia, cold/heat intolerance  Heme:  No petechiae, ecchymosis, easy bruisability  Skin:  No rash, tattoos, scars, edema      PHYSICAL EXAM:   Vital Signs:  Vital Signs Last 24 Hrs  T(C): 36.7 (08 Jun 2025 11:05), Max: 36.7 (07 Jun 2025 17:56)  T(F): 98.1 (08 Jun 2025 11:05), Max: 98.1 (08 Jun 2025 11:05)  HR: 60 (08 Jun 2025 11:05) (50 - 72)  BP: 129/72 (08 Jun 2025 11:05) (129/72 - 150/71)  BP(mean): 91 (07 Jun 2025 18:18) (91 - 91)  RR: 18 (08 Jun 2025 11:05) (17 - 18)  SpO2: 94% (08 Jun 2025 11:05) (94% - 98%)    Parameters below as of 08 Jun 2025 11:05  Patient On (Oxygen Delivery Method): room air      Daily Height in cm: 167.64 (07 Jun 2025 18:18)    Daily     GENERAL:  Appears stated age, well-groomed, well-nourished, no distress  HEENT:  NC/AT,  conjunctivae clear and pink, no thyromegaly, nodules, adenopathy, no JVD, sclera -anicteric  CHEST:  Full & symmetric excursion, no increased effort, breath sounds clear  HEART:  Regular rhythm, S1, S2, no murmur/rub/S3/S4, no abdominal bruit, no edema  ABDOMEN:  Soft, +tenderness to palpation LLQ. non-distended, normoactive bowel sounds,  no masses ,no hepato-splenomegaly, no signs of chronic liver disease  EXTEREMITIES:  no cyanosis,clubbing or edema  SKIN:  No rash/erythema/ecchymoses/petechiae/wounds/abscess/warm/dry  NEURO:  Alert, oriented, no asterixis, no tremor, no encephalopathy    LABS:                        15.3   8.63  )-----------( 270      ( 08 Jun 2025 10:47 )             47.7     06-08    139  |  102  |  12  ----------------------------<  105[H]  3.9   |  21[L]  |  1.02    Ca    9.4      08 Jun 2025 10:47    TPro  7.0  /  Alb  3.9  /  TBili  0.9  /  DBili  x   /  AST  16  /  ALT  21  /  AlkPhos  182[H]  06-08    LIVER FUNCTIONS - ( 08 Jun 2025 10:47 )  Alb: 3.9 g/dL / Pro: 7.0 g/dL / ALK PHOS: 182 U/L / ALT: 21 U/L / AST: 16 U/L / GGT: x           PT/INR - ( 07 Jun 2025 12:21 )   PT: 11.8 sec;   INR: 1.03 ratio         PTT - ( 07 Jun 2025 12:21 )  PTT:32.0 sec  Urinalysis Basic - ( 08 Jun 2025 10:47 )    Color: x / Appearance: x / SG: x / pH: x  Gluc: 105 mg/dL / Ketone: x  / Bili: x / Urobili: x   Blood: x / Protein: x / Nitrite: x   Leuk Esterase: x / RBC: x / WBC x   Sq Epi: x / Non Sq Epi: x / Bacteria: x          Imaging:  < from: CT Abdomen and Pelvis w/ IV Cont (06.07.25 @ 13:08) >    ACC: [id number] EXAM:  CT ABDOMEN AND PELVIS IC   ORDERED BY:  BRANDAN JOHNSON     PROCEDURE DATE:  06/07/2025          INTERPRETATION:  CLINICAL INFORMATION: Rule out active GI bleed.    COMPARISON: 5/14/2023.    CONTRAST/COMPLICATIONS:  IV Contrast: Omnipaque 350  90 cc administered   10 cc discarded  Oral Contrast: NONE.    PROCEDURE:  CT of the Abdomen and Pelvis was performed.  Precontrast, Arterial and Delayed phases were performed.  Sagittal and coronal reformats were performed.    FINDINGS:  LOWER CHEST: Small right pleural effusion with associated compressive   atelectasis. Mild cardiomegaly. Calcified atherosclerosis of the coronary   arteries and distal descending thoracic aorta.    LIVER: Liver looks good  BILE DUCTS: Normal caliber.  GALLBLADDER: Within normal limits.  SPLEEN: Within normal limits.  PANCREAS: Atrophic and infiltrated by fat.  ADRENALS: Within normal limits.  KIDNEYS/URETERS: Within normal limits.    BLADDER: Within normal limits.  REPRODUCTIVE ORGANS: Mild prostatomegaly.    BOWEL: No bowel obstruction. Appendix is not visualized. No evidence of   inflammation in the pericecal region. Colonic diverticulosis. Mild   mesorectal inflammatory stranding involving a short segment of mid to   low-distal descending colon; consistent with segmental colitis. No   evidence of active GI bleed during the exam.  PERITONEUM/RETROPERITONEUM: Within normal limits.  VESSELS: Atherosclerotic changes. Stable infrarenal abdominal aorta   aneurysm measuring up to 4.7 cm, and demonstrating small mural bland   thrombus, some of the appears slightly unstable, but unchanged.  LYMPH NODES: No lymphadenopathy.  ABDOMINAL WALL: Small left inguinal hernia containing fat and a small   amount of fluid. Small umbilical hernia containing only fat.  BONES: Degenerative changes. Diffuse osseous demineralization.    IMPRESSION:  Mild left segmental colitis, without active GI bleed during the exam.   Read above text for additional findings, and detailed explanations.    < end of copied text >

## 2025-06-08 NOTE — PATIENT PROFILE ADULT - FALL HARM RISK - HARM RISK INTERVENTIONS

## 2025-06-08 NOTE — CONSULT NOTE ADULT - TIME BILLING
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), history of bleeding stomach ulcer (40-50 years ago), L carotid artery stent 2 years ago, chronic abdominal pain with history of colitis presents for 2 weeks of abdominal pain (LLQ) that has been getting worse with 1 large episode of bright red blood/dark blood per rectum    #Abdominal Pain, BRBPR  -CTAP with colitis w/o active GIB  -Hg remains stable - cont to trend   -F/u GI eval  -If a scope is needed, he remains cv stable to proceed     #CAD sp PCI  -Per pt, last stent was placed 1 yr ago at [facility]  -Ok to hold plavix for now   -Cont statin  -Denies cp, sob     #HTN  -BP stable  -Cont amlodipine, metoprolol

## 2025-06-09 VITALS
SYSTOLIC BLOOD PRESSURE: 135 MMHG | HEART RATE: 52 BPM | TEMPERATURE: 98 F | RESPIRATION RATE: 18 BRPM | OXYGEN SATURATION: 96 % | DIASTOLIC BLOOD PRESSURE: 73 MMHG

## 2025-06-09 LAB
ADD ON TEST-SPECIMEN IN LAB: SIGNIFICANT CHANGE UP
ALBUMIN SERPL ELPH-MCNC: 3.6 G/DL — SIGNIFICANT CHANGE UP (ref 3.3–5)
ALP SERPL-CCNC: 166 U/L — HIGH (ref 40–120)
ALT FLD-CCNC: 18 U/L — SIGNIFICANT CHANGE UP (ref 10–45)
ANION GAP SERPL CALC-SCNC: 18 MMOL/L — HIGH (ref 5–17)
AST SERPL-CCNC: 14 U/L — SIGNIFICANT CHANGE UP (ref 10–40)
B-OH-BUTYR SERPL-SCNC: 0.4 MMOL/L — SIGNIFICANT CHANGE UP
BILIRUB SERPL-MCNC: 1 MG/DL — SIGNIFICANT CHANGE UP (ref 0.2–1.2)
BUN SERPL-MCNC: 12 MG/DL — SIGNIFICANT CHANGE UP (ref 7–23)
CALCIUM SERPL-MCNC: 9.1 MG/DL — SIGNIFICANT CHANGE UP (ref 8.4–10.5)
CHLORIDE SERPL-SCNC: 103 MMOL/L — SIGNIFICANT CHANGE UP (ref 96–108)
CO2 SERPL-SCNC: 17 MMOL/L — LOW (ref 22–31)
CREAT SERPL-MCNC: 1.09 MG/DL — SIGNIFICANT CHANGE UP (ref 0.5–1.3)
EGFR: 67 ML/MIN/1.73M2 — SIGNIFICANT CHANGE UP
EGFR: 67 ML/MIN/1.73M2 — SIGNIFICANT CHANGE UP
GLUCOSE SERPL-MCNC: 110 MG/DL — HIGH (ref 70–99)
HCT VFR BLD CALC: 43.8 % — SIGNIFICANT CHANGE UP (ref 39–50)
HGB BLD-MCNC: 14.4 G/DL — SIGNIFICANT CHANGE UP (ref 13–17)
MCHC RBC-ENTMCNC: 27.5 PG — SIGNIFICANT CHANGE UP (ref 27–34)
MCHC RBC-ENTMCNC: 32.9 G/DL — SIGNIFICANT CHANGE UP (ref 32–36)
MCV RBC AUTO: 83.6 FL — SIGNIFICANT CHANGE UP (ref 80–100)
NRBC BLD AUTO-RTO: 0 /100 WBCS — SIGNIFICANT CHANGE UP (ref 0–0)
PLATELET # BLD AUTO: 262 K/UL — SIGNIFICANT CHANGE UP (ref 150–400)
POTASSIUM SERPL-MCNC: 3.2 MMOL/L — LOW (ref 3.5–5.3)
POTASSIUM SERPL-SCNC: 3.2 MMOL/L — LOW (ref 3.5–5.3)
PROT SERPL-MCNC: 6.4 G/DL — SIGNIFICANT CHANGE UP (ref 6–8.3)
RBC # BLD: 5.24 M/UL — SIGNIFICANT CHANGE UP (ref 4.2–5.8)
RBC # FLD: 15.6 % — HIGH (ref 10.3–14.5)
SODIUM SERPL-SCNC: 138 MMOL/L — SIGNIFICANT CHANGE UP (ref 135–145)
WBC # BLD: 6.64 K/UL — SIGNIFICANT CHANGE UP (ref 3.8–10.5)
WBC # FLD AUTO: 6.64 K/UL — SIGNIFICANT CHANGE UP (ref 3.8–10.5)

## 2025-06-09 PROCEDURE — 82330 ASSAY OF CALCIUM: CPT

## 2025-06-09 PROCEDURE — 85027 COMPLETE CBC AUTOMATED: CPT

## 2025-06-09 PROCEDURE — 82010 KETONE BODYS QUAN: CPT

## 2025-06-09 PROCEDURE — 82803 BLOOD GASES ANY COMBINATION: CPT

## 2025-06-09 PROCEDURE — 99285 EMERGENCY DEPT VISIT HI MDM: CPT | Mod: 25

## 2025-06-09 PROCEDURE — 84132 ASSAY OF SERUM POTASSIUM: CPT

## 2025-06-09 PROCEDURE — 80053 COMPREHEN METABOLIC PANEL: CPT

## 2025-06-09 PROCEDURE — 85018 HEMOGLOBIN: CPT

## 2025-06-09 PROCEDURE — 86900 BLOOD TYPING SEROLOGIC ABO: CPT

## 2025-06-09 PROCEDURE — 71045 X-RAY EXAM CHEST 1 VIEW: CPT

## 2025-06-09 PROCEDURE — 74177 CT ABD & PELVIS W/CONTRAST: CPT

## 2025-06-09 PROCEDURE — 85730 THROMBOPLASTIN TIME PARTIAL: CPT

## 2025-06-09 PROCEDURE — 82435 ASSAY OF BLOOD CHLORIDE: CPT

## 2025-06-09 PROCEDURE — 82947 ASSAY GLUCOSE BLOOD QUANT: CPT

## 2025-06-09 PROCEDURE — 96374 THER/PROPH/DIAG INJ IV PUSH: CPT

## 2025-06-09 PROCEDURE — 85014 HEMATOCRIT: CPT

## 2025-06-09 PROCEDURE — 86901 BLOOD TYPING SEROLOGIC RH(D): CPT

## 2025-06-09 PROCEDURE — 85610 PROTHROMBIN TIME: CPT

## 2025-06-09 PROCEDURE — 86850 RBC ANTIBODY SCREEN: CPT

## 2025-06-09 PROCEDURE — 83605 ASSAY OF LACTIC ACID: CPT

## 2025-06-09 PROCEDURE — 84295 ASSAY OF SERUM SODIUM: CPT

## 2025-06-09 PROCEDURE — 85025 COMPLETE CBC W/AUTO DIFF WBC: CPT

## 2025-06-09 RX ORDER — CIPROFLOXACIN HCL 250 MG
1 TABLET ORAL
Qty: 16 | Refills: 0
Start: 2025-06-09 | End: 2025-06-16

## 2025-06-09 RX ORDER — POLYETHYLENE GLYCOL 3350 17 G/17G
17 POWDER, FOR SOLUTION ORAL
Qty: 0 | Refills: 0 | DISCHARGE
Start: 2025-06-09

## 2025-06-09 RX ORDER — AMLODIPINE BESYLATE 10 MG/1
1 TABLET ORAL
Qty: 30 | Refills: 0
Start: 2025-06-09 | End: 2025-07-08

## 2025-06-09 RX ORDER — SENNA 187 MG
2 TABLET ORAL AT BEDTIME
Refills: 0 | Status: DISCONTINUED | OUTPATIENT
Start: 2025-06-09 | End: 2025-06-09

## 2025-06-09 RX ORDER — SENNA 187 MG
2 TABLET ORAL
Qty: 0 | Refills: 0 | DISCHARGE
Start: 2025-06-09

## 2025-06-09 RX ORDER — FUROSEMIDE 10 MG/ML
1 INJECTION INTRAMUSCULAR; INTRAVENOUS
Refills: 0 | DISCHARGE

## 2025-06-09 RX ORDER — METRONIDAZOLE 250 MG
1 TABLET ORAL
Qty: 24 | Refills: 0
Start: 2025-06-09 | End: 2025-06-16

## 2025-06-09 RX ORDER — POLYETHYLENE GLYCOL 3350 17 G/17G
17 POWDER, FOR SOLUTION ORAL DAILY
Refills: 0 | Status: DISCONTINUED | OUTPATIENT
Start: 2025-06-09 | End: 2025-06-09

## 2025-06-09 RX ADMIN — Medication 500 MILLIGRAM(S): at 05:29

## 2025-06-09 RX ADMIN — AMLODIPINE BESYLATE 10 MILLIGRAM(S): 10 TABLET ORAL at 05:27

## 2025-06-09 RX ADMIN — CLOPIDOGREL BISULFATE 75 MILLIGRAM(S): 75 TABLET, FILM COATED ORAL at 11:28

## 2025-06-09 RX ADMIN — Medication 500 MILLIGRAM(S): at 14:15

## 2025-06-09 RX ADMIN — Medication 200 MILLIGRAM(S): at 05:26

## 2025-06-09 RX ADMIN — Medication 40 MILLIGRAM(S): at 05:27

## 2025-06-09 RX ADMIN — Medication 40 MILLIEQUIVALENT(S): at 11:28

## 2025-06-09 RX ADMIN — POLYETHYLENE GLYCOL 3350 17 GRAM(S): 17 POWDER, FOR SOLUTION ORAL at 11:30

## 2025-06-09 NOTE — DISCHARGE NOTE PROVIDER - NSDCCPCAREPLAN_GEN_ALL_CORE_FT
PRINCIPAL DISCHARGE DIAGNOSIS  Diagnosis: GI bleed  Assessment and Plan of Treatment: You Hemoglobin remained stable in patient. Follow up with GI as oupatient to determine if you need colonscopy.   CT abdomen/pelvis revealed colitis without active gastrointestinal bleeding (GIB). Hemoglobin remained stable throughout admission. Patient will follow up with Gastroenterology (GI) for evaluation and possible colonoscopy. He is deemed cardiovascularly stable to undergo colonoscopy if indicated.      SECONDARY DISCHARGE DIAGNOSES  Diagnosis: Colitis  Assessment and Plan of Treatment: You will continue Cipro and Flagyl. Follow up with GI    Diagnosis: Hypertension  Assessment and Plan of Treatment: Blood pressure remained stable. Amlodipine was decreased to 5mg daily to avoid hypotension. Monitor your blood pressure and consume a diet low in sodium.    Diagnosis: Lower extremity edema  Assessment and Plan of Treatment: Use your Lasix if you develop lower extremity edema

## 2025-06-09 NOTE — DISCHARGE NOTE PROVIDER - NSFOLLOWUPCLINICS_GEN_ALL_ED_FT
[facility] General Internal Medicine  General Internal Medicine  [address] [id number]  Phone: [phone]  Fax:   Follow Up Time: Routine

## 2025-06-09 NOTE — DISCHARGE NOTE PROVIDER - CARE PROVIDER_API CALL
[facility]  Gastroenterology  [address] [place] [address]  Phone: [phone]  Fax: [phone]  Follow Up Time: 1 month    Adithya Frye  Nephrology  [address] [place] [address], [address]  [place] [address]  Phone: [phone]  Fax: [phone]  Established Patient  Follow Up Time: 1 week

## 2025-06-09 NOTE — PROGRESS NOTE ADULT - ASSESSMENT
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   Read above text for additional findings, and detailed explanations.      Acute Colitis   iv abx   GI consulte   Advance diet as tolerated     CAD   Carotis stent   Continue with Plavix   Aspirin on hold   Cards eval         Advance diet as tolerated tomorrow           
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), history of bleeding stomach ulcer (40-50 years ago), L carotid artery stent 2 years ago, chronic abdominal pain with history of colitis presents for 2 weeks of abdominal pain (LLQ) that has been getting worse with 1 large episode of bright red blood/dark blood per rectum    #Abdominal Pain, BRBPR  -CTAP with colitis w/o active GIB  -Hg remains stable - cont to trend   -F/u GI eval  -If a scope is needed, he remains cv stable to proceed     #CAD sp PCI  -Per pt, last stent was placed 1 yr ago at [facility]  -Ok to hold plavix for now   -Cont statin  -Denies cp, sob     #HTN  -BP stable  -Cont amlodipine, metoprolol    55 minutes spent on total encounter; more than 50% of the visit was spent counseling and/or coordinating care by the attending physician.    
#Colitis    - CT reviewed and discussed with patient   - GI PCR to r/o infectious causes of colitis  - Bowel regimen. Miralax daily, senna qhs  - prn pain meds  - Monitor hgb daily  - Monitor GI function and for any further episodes of GI bleed  - Ok to continue with plavix for now given recent stent x1 yr ago and hgb WNL.   - Continue with cipro/flagyl  - Advance diet as tolerated  - Discussed with patient that if colitis is determined to be non infectious in etiology, recommend colonoscopy in 4-6 weeks once inflammation has resolved     will follow    I reviewed the overnight course of events on the unit, re-confirming the patient history. I discussed the care with the patient   The plan of care was discussed by the nurse practitioner and modifications were made to the notation where appropriate.   Differential diagnosis and plan of care discussed with patient after the evaluation  35 minutes spent on total encounter of which more than fifty percent of the encounter was spent counseling and/or coordinating care with the attending physician.  Advanced care planning was discussed with patient and family.  Advanced care planning forms were reviewed and discussed.  Risks, benefits and alternatives of gastroenterologic procedures/interventions were discussed in detail and all questions were answered.

## 2025-06-09 NOTE — DISCHARGE NOTE PROVIDER - HOSPITAL COURSE
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    No hx nausea/ vomiting.   Currently patient denies any pain, anxious to go home.     (07 Jun 2025 18:18)    Hospital Course:  85-year-old male with a history of paraproteinemia, CAD s/p stent, HTN, HLD, enlarging abdominal aortic aneurysm, and remote history of bleeding stomach ulcer presented with two weeks of worsening left lower quadrant abdominal pain culminating in a large episode of BRBPR. He also reports chronic abdominal pain and a history of colitis.    Abdominal Pain/BRBPR: CT abdomen/pelvis revealed colitis without active gastrointestinal bleeding (GIB). Hemoglobin remained stable throughout admission. Patient will follow up with Gastroenterology (GI) for evaluation and possible colonoscopy. He is deemed cardiovascularly stable to undergo colonoscopy if indicated. Patient to complete 10 day course of Flagyl and Cipro.   CKD-2 with Proteinuria: Nephrology was consulted. Farxiga 10mg PO daily and losartan 100mg PO daily are to be resumed upon discharge for their antiproteinuric effect. Patient is advised to avoid nephrotoxins and monitor electrolytes. He will continue outpatient nephrology follow-up.  Hypertension: Blood pressure remained stable. Amlodipine was decreased to 5mg daily to avoid hypotension given the resumption of losartan. Metoprolol continued.  LE Edema: Lasix 40mg PO daily was held on discharge. Patient is instructed to resume lasix as needed for increased LE edema. No prior transthoracic echocardiogram (TTE) on record.  Metabolic Acidosis: Resolved. Blood gas was unremarkable. Beta-hydroxybutyrate (BOH) checked and negative for ketoacidosis.  CAD s/p PCI: Patient reports last stent placement one year prior. Plavix held during this admission, was resumed on discharge. Statin therapy continued. Patient denies chest pain and shortness of breath.    Patient is medically clear for discharge by Dr. Ceron to home. Outpatient follow up with PCP, renal  Med recc and clearance discussed with attending    Important Medication Changes and Reason:  Lasix 40mg PO daily was held on discharge.    Active or Pending Issues Requiring Follow-up:  follow up with GI, renal     Advanced Directives:   [ x] Full code  [ ] DNR  [ ] Hospice    Discharge Diagnoses:  HTN   CAD   Colitis   BRBPR

## 2025-06-09 NOTE — DISCHARGE NOTE PROVIDER - NSDCMRMEDTOKEN_GEN_ALL_CORE_FT
amLODIPine 5 mg oral tablet: 1 tab(s) orally once a day  atorvastatin 80 mg oral tablet: 1 tab(s) orally once a day (at bedtime)  cholecalciferol 25 mcg (1000 intl units) oral tablet: 1 tab(s) orally once a day  ciprofloxacin 500 mg oral tablet: 1 tab(s) orally 2 times a day  clopidogrel 75 mg oral tablet: 1 tab(s) orally once a day  cyanocobalamin 1000 mcg sublingual tablet: 1 tab(s) sublingually once a day  Farxiga 10 mg oral tablet: 1 tab(s) orally once a day  losartan 100 mg oral tablet: 1 tab(s) orally once a day  metroNIDAZOLE 500 mg oral tablet: 1 tab(s) orally every 8 hours  polyethylene glycol 3350 oral powder for reconstitution: 17 gram(s) orally once a day  senna leaf extract oral tablet: 2 tab(s) orally once a day (at bedtime)  Toprol- mg oral tablet, extended release: 1 tab(s) orally once a day

## 2025-06-09 NOTE — DISCHARGE NOTE PROVIDER - PROVIDER TOKENS
PROVIDER:[TOKEN:[[id number]],FOLLOWUP:[1 month]],PROVIDER:[TOKEN:[[id number]],FOLLOWUP:[1 week],ESTABLISHEDPATIENT:[T]]

## 2025-06-09 NOTE — CONSULT NOTE ADULT - ASSESSMENT
85y Male with history of paraproteinemia presents with ab pain found to have colitis. Nephrology consulted for h/o proteinuria.    1) CKD-2: With proteinuria for which patient follows with me as an outpatient in setting of paraproteinemia (positive IgG lambda band). Would resume farxiga 10 mg PO daily and losartan 100 mg daily on discharge for anti-proteinuric effect. Avoid nephrotoxins. Monitor electrolytes.    2) HTN with CKD: BP controlled. Would decrease amlodipine to 5 mg daily to avoid hypotension given plans to restart losartan as above.    3) LE edema: Would hold lasix 40 mg PO daily on discharge but patient advised to resume prn if LE increases. No prior TTE.    4) Metabolic acidosis: Blood gas unremarkable. Would check BOH to ensure patient without ketoacidosis.      Patient advised to follow up within 1-2 weeks.      [facility] NEPHROLOGY  Mathew Hendrickson M.D.  Adithya Frye D.O.  Vivi Goldman M.D.  MD Lola Maxwell, MSN, ANP-C    Telephone: [phone]  Facsimile: [phone] [address] [place] [address], [address]  [place] [address]

## 2025-06-09 NOTE — CONSULT NOTE ADULT - SUBJECTIVE AND OBJECTIVE BOX
[facility] NEPHROLOGY- CONSULTATION NOTE    85y Male with history of below presents with ab pain found to have colitis. Nephrology consulted for h/o proteinuria.    Patient well known to our practice as follows with me as an outpatient for h/o CKD-2 with proteinuria and positive serum FELIPA. Patient on losartan 100 mg daily and farxiga 10 mg PO daily for anti-proteinuric effect. Patient also on lasix 40 mg PO daily as an outpatient for chronic LE edema. Patient s/p CT with IV contrast on 6/7 with relatively stable renal function thereafter. Patient's losartan, farxiga and lasix on hold.    REVIEW OF SYSTEMS:  Gen: no fevers  HEENT: no rhinorrhea  Neck: no sore throat  Cards: no chest pain  Resp: no dyspnea  GI: no nausea or vomiting or diarrhea, + ab pain improving  : no dysuria or hematuria  Vascular: no LE edema  Derm: no rashes  Neuro: no numbness/tingling    No Known Allergies      Home Medications Reviewed  Hospital Medications:   MEDICATIONS  (STANDING):  amLODIPine   Tablet 10 milliGRAM(s) Oral daily  atorvastatin 80 milliGRAM(s) Oral at bedtime  ciprofloxacin   IVPB 400 milliGRAM(s) IV Intermittent every 12 hours  clopidogrel Tablet 75 milliGRAM(s) Oral daily  metoprolol succinate  milliGRAM(s) Oral daily  metroNIDAZOLE    Tablet 500 milliGRAM(s) Oral every 8 hours  pantoprazole  Injectable 40 milliGRAM(s) IV Push two times a day      PAST MEDICAL & SURGICAL HISTORY:  CAD (coronary artery disease)      HTN (hypertension)      HLD (hyperlipidemia)      H/O arthroscopy          FAMILY HISTORY:  FH: prostate cancer (Father)    FH: esophageal cancer (Mother)        SOCIAL HISTORY:  Denies toxic substance use     VITALS:  T(F): 98.2 (06-09-25 @ 04:40), Max: 98.3 (06-08-25 @ 20:13)  HR: 55 (06-09-25 @ 04:40)  BP: 120/60 (06-09-25 @ 04:40)  RR: 18 (06-09-25 @ 04:40)  SpO2: 95% (06-09-25 @ 04:40)  Wt(kg): --    06-08 @ 07:01  -  06-09 @ 07:00  --------------------------------------------------------  IN: 1160 mL / OUT: 0 mL / NET: 1160 mL          PHYSICAL EXAM:  Gen: NAD, calm  HEENT: MMM  Neck: no JVD  Cards: RRR, +S1/S2, no M/G/R  Resp: CTA B/L  GI: soft, + TTP, ND, NABS  : no CVA tenderness  Vascular: no LE edema B/L  Derm: no rashes  Neuro: non-focal    LABS:  06-09    138  |  103  |  12  ----------------------------<  110[H]  3.2[L]   |  17[L]  |  1.09    Ca    9.1      09 Jun 2025 07:22    TPro  6.4  /  Alb  3.6  /  TBili  1.0  /  DBili      /  AST  14  /  ALT  18  /  AlkPhos  166[H]  06-09    Creatinine Trend: 1.09 <--, 1.02 <--, 1.25 <--                        14.4   6.64  )-----------( 262      ( 09 Jun 2025 07:25 )             43.8     Urine Studies:  Urinalysis Basic - ( 09 Jun 2025 07:22 )    Color:  / Appearance:  / SG:  / pH:   Gluc: 110 mg/dL / Ketone:   / Bili:  / Urobili:    Blood:  / Protein:  / Nitrite:    Leuk Esterase:  / RBC:  / WBC    Sq Epi:  / Non Sq Epi:  / Bacteria:           RADIOLOGY & ADDITIONAL STUDIES:    < from: Xray Chest 1 View AP/PA (06.07.25 @ 14:43) >  IMPRESSION:  No pneumoperitoneum.    --- End of Report ---    < end of copied text >      < from: CT Abdomen and Pelvis w/ IV Cont (06.07.25 @ 13:08) >    IMPRESSION:  Mild left segmental colitis, without active GI bleed during the exam.   Read above text for additional findings, and detailed explanations.        --- End of Report ---    < end of copied text >      < from: CT Abdomen and Pelvis w/ IV Cont (06.07.25 @ 13:08) >  KIDNEYS/URETERS: Within normal limits.    BLADDER: Within normal limits.    < end of copied text >

## 2025-06-09 NOTE — DISCHARGE NOTE NURSING/CASE MANAGEMENT/SOCIAL WORK - FINANCIAL ASSISTANCE
[facility] provides services at a reduced cost to those who are determined to be eligible through [facility]’s financial assistance program. Information regarding [facility]’s financial assistance program can be found by going to [email or web address] or by calling [phone].

## 2025-06-09 NOTE — PROGRESS NOTE ADULT - SUBJECTIVE AND OBJECTIVE BOX
CARDIOLOGY FOLLOW UP - Dr. Wang  Date of Service: 06-09-25 @ 10:58    CC: no events    Review of Systems:  Constitutional: No fever, weight loss, or fatigue  Respiratory: No cough, wheezing, or hemoptysis, no shortness of breath  Cardiovascular: No chest pain, palpitations, passing out, dizziness, or leg swelling  Gastrointestinal: No abd or epigastric pain. No nausea, vomiting, or hematemesis; no diarrhea or consiptaiton, no melena or hematochezia  Vascular: No edema     TELEMETRY:    PHYSICAL EXAM:  T(C): 36.8 (06-09-25 @ 04:40), Max: 36.8 (06-08-25 @ 20:13)  HR: 55 (06-09-25 @ 04:40) (54 - 60)  BP: 120/60 (06-09-25 @ 04:40) (120/60 - 129/72)  RR: 18 (06-09-25 @ 04:40) (18 - 18)  SpO2: 95% (06-09-25 @ 04:40) (93% - 95%)  Wt(kg): --  I&O's Summary    08 Jun 2025 07:01  -  09 Jun 2025 07:00  --------------------------------------------------------  IN: 1160 mL / OUT: 0 mL / NET: 1160 mL        Appearance: Normal	  Cardiovascular: Normal S1 S2,RRR, No JVD, No murmurs  Respiratory: Lungs clear to auscultation	  Gastrointestinal:  Soft, Non-tender, + BS	  Extremities: Normal range of motion, No clubbing, cyanosis or edema  Vascular: Peripheral pulses palpable 2+ bilaterally       Home Medications:  atorvastatin 80 mg oral tablet: 1 tab(s) orally once a day (at bedtime) (07 Jun 2025 18:27)  cholecalciferol 25 mcg (1000 intl units) oral tablet: 1 tab(s) orally once a day (07 Jun 2025 18:27)  clopidogrel 75 mg oral tablet: 1 tab(s) orally once a day (07 Jun 2025 18:27)  cyanocobalamin 1000 mcg sublingual tablet: 1 tab(s) sublingually once a day (07 Jun 2025 18:27)  Farxiga 10 mg oral tablet: 1 tab(s) orally once a day (07 Jun 2025 18:27)  furosemide 40 mg oral tablet: 1 tab(s) orally once a day (07 Jun 2025 18:27)  losartan 100 mg oral tablet: 1 tab(s) orally once a day (07 Jun 2025 18:27)  Norvasc 10 mg oral tablet: 1 tab(s) orally once a day (07 Jun 2025 18:27)  Toprol- mg oral tablet, extended release: 1 tab(s) orally once a day (07 Jun 2025 18:27)        MEDICATIONS  (STANDING):  amLODIPine   Tablet 10 milliGRAM(s) Oral daily  atorvastatin 80 milliGRAM(s) Oral at bedtime  ciprofloxacin   IVPB 400 milliGRAM(s) IV Intermittent every 12 hours  clopidogrel Tablet 75 milliGRAM(s) Oral daily  metoprolol succinate  milliGRAM(s) Oral daily  metroNIDAZOLE    Tablet 500 milliGRAM(s) Oral every 8 hours  pantoprazole  Injectable 40 milliGRAM(s) IV Push two times a day  potassium chloride    Tablet ER 40 milliEquivalent(s) Oral once        EKG:  RADIOLOGY:  DIAGNOSTIC TESTING:  [ ] Echocardiogram:  [ ] Catherterization:  [ ] Stress Test:  OTHER:     LABS:	 	                          14.4   6.64  )-----------( 262      ( 09 Jun 2025 07:25 )             43.8     06-09    138  |  103  |  12  ----------------------------<  110[H]  3.2[L]   |  17[L]  |  1.09    Ca    9.1      09 Jun 2025 07:22    TPro  6.4  /  Alb  3.6  /  TBili  1.0  /  DBili  x   /  AST  14  /  ALT  18  /  AlkPhos  166[H]  06-09      PT/INR - ( 07 Jun 2025 12:21 )   PT: 11.8 sec;   INR: 1.03 ratio         PTT - ( 07 Jun 2025 12:21 )  PTT:32.0 sec    CARDIAC MARKERS:                  
Patient is a 85y old  Male who presents with a chief complaint of BRBPR X 2 days (08 Jun 2025 13:33)      SUBJECTIVE / OVERNIGHT EVENTS: Events noted     MEDICATIONS  (STANDING):  amLODIPine   Tablet 10 milliGRAM(s) Oral daily  atorvastatin 80 milliGRAM(s) Oral at bedtime  ciprofloxacin   IVPB 400 milliGRAM(s) IV Intermittent every 12 hours  clopidogrel Tablet 75 milliGRAM(s) Oral daily  metoprolol succinate  milliGRAM(s) Oral daily  metroNIDAZOLE    Tablet 500 milliGRAM(s) Oral every 8 hours  pantoprazole  Injectable 40 milliGRAM(s) IV Push two times a day    MEDICATIONS  (PRN):      CAPILLARY BLOOD GLUCOSE        I&O's Summary    08 Jun 2025 07:01  -  08 Jun 2025 23:07  --------------------------------------------------------  IN: 1160 mL / OUT: 0 mL / NET: 1160 mL      T(C): 36.8 (06-08-25 @ 20:13), Max: 36.8 (06-08-25 @ 20:13)  HR: 54 (06-08-25 @ 20:13) (54 - 54)  BP: 120/63 (06-08-25 @ 20:13) (120/63 - 120/63)  RR: 18 (06-08-25 @ 20:13) (18 - 18)  SpO2: 93% (06-08-25 @ 20:13) (93% - 93%)    PHYSICAL EXAM:  GENERAL: NAD, well-developed  HEAD:  Atraumatic, Normocephalic  EYES: EOMI, PERRLA, conjunctiva and sclera clear  NECK: Supple, No JVD  CHEST/LUNG: Clear to auscultation bilaterally; No wheeze  HEART: Regular rate and rhythm; No murmurs, rubs, or gallops  ABDOMEN: Soft, Nontender, Nondistended; Bowel sounds present  EXTREMITIES:  2+ Peripheral Pulses, No clubbing, cyanosis, or edema  PSYCH: AAOx3  NEUROLOGY: non-focal  SKIN: No rashes or lesions    LABS:                        15.3   8.63  )-----------( 270      ( 08 Jun 2025 10:47 )             47.7     06-08    139  |  102  |  12  ----------------------------<  105[H]  3.9   |  21[L]  |  1.02    Ca    9.4      08 Jun 2025 10:47    TPro  7.0  /  Alb  3.9  /  TBili  0.9  /  DBili  x   /  AST  16  /  ALT  21  /  AlkPhos  182[H]  06-08    PT/INR - ( 07 Jun 2025 12:21 )   PT: 11.8 sec;   INR: 1.03 ratio         PTT - ( 07 Jun 2025 12:21 )  PTT:32.0 sec      Urinalysis Basic - ( 08 Jun 2025 10:47 )    Color: x / Appearance: x / SG: x / pH: x  Gluc: 105 mg/dL / Ketone: x  / Bili: x / Urobili: x   Blood: x / Protein: x / Nitrite: x   Leuk Esterase: x / RBC: x / WBC x   Sq Epi: x / Non Sq Epi: x / Bacteria: x        RADIOLOGY & ADDITIONAL TESTS:    Imaging Personally Reviewed:    Consultant(s) Notes Reviewed:      Care Discussed with Consultants/Other Providers:  
[facility]      Dimitris Scott NPJeannineC    [address] [place] [id number]  [phone]      INTERVAL HPI/OVERNIGHT EVENTS:  Pt s/e  Reports LLQ abd pain improving, reports pain as very mild  Denies N/V  Tolerating diet  hgb stable  Endorses last BM saturday    MEDICATIONS  (STANDING):  amLODIPine   Tablet 10 milliGRAM(s) Oral daily  atorvastatin 80 milliGRAM(s) Oral at bedtime  ciprofloxacin   IVPB 400 milliGRAM(s) IV Intermittent every 12 hours  clopidogrel Tablet 75 milliGRAM(s) Oral daily  metoprolol succinate  milliGRAM(s) Oral daily  metroNIDAZOLE    Tablet 500 milliGRAM(s) Oral every 8 hours  pantoprazole  Injectable 40 milliGRAM(s) IV Push two times a day  polyethylene glycol 3350 17 Gram(s) Oral daily  senna 2 Tablet(s) Oral at bedtime    MEDICATIONS  (PRN):      Allergies    No Known Allergies    Intolerances              PHYSICAL EXAM:   Vital Signs:  Vital Signs Last 24 Hrs  T(C): 36.6 (09 Jun 2025 11:28), Max: 36.8 (08 Jun 2025 20:13)  T(F): 97.8 (09 Jun 2025 11:28), Max: 98.3 (08 Jun 2025 20:13)  HR: 52 (09 Jun 2025 11:28) (52 - 55)  BP: 135/73 (09 Jun 2025 11:28) (120/60 - 135/73)  BP(mean): --  RR: 18 (09 Jun 2025 11:28) (18 - 18)  SpO2: 96% (09 Jun 2025 11:28) (93% - 96%)    Parameters below as of 09 Jun 2025 11:28  Patient On (Oxygen Delivery Method): room air      Daily     Daily     GENERAL:  Appears stated age,   HEENT:  NC/AT,    CHEST:  Full & symmetric excursion,   HEART:  Regular rhythm,  ABDOMEN:  Soft, non-tender, non-distended,  EXTEREMITIES:  no cyanosis  SKIN:  No rash  NEURO:  Alert, oriented      LABS:                        14.4   6.64  )-----------( 262      ( 09 Jun 2025 07:25 )             43.8     06-09    138  |  103  |  12  ----------------------------<  110[H]  3.2[L]   |  17[L]  |  1.09    Ca    9.1      09 Jun 2025 07:22    TPro  6.4  /  Alb  3.6  /  TBili  1.0  /  DBili  x   /  AST  14  /  ALT  18  /  AlkPhos  166[H]  06-09    PT/INR - ( 07 Jun 2025 12:21 )   PT: 11.8 sec;   INR: 1.03 ratio         PTT - ( 07 Jun 2025 12:21 )  PTT:32.0 sec  Urinalysis Basic - ( 09 Jun 2025 07:22 )    Color: x / Appearance: x / SG: x / pH: x  Gluc: 110 mg/dL / Ketone: x  / Bili: x / Urobili: x   Blood: x / Protein: x / Nitrite: x   Leuk Esterase: x / RBC: x / WBC x   Sq Epi: x / Non Sq Epi: x / Bacteria: x        RADIOLOGY & ADDITIONAL TESTS:

## 2025-06-09 NOTE — DISCHARGE NOTE NURSING/CASE MANAGEMENT/SOCIAL WORK - PATIENT PORTAL LINK FT
You can access the FollowMyHealth Patient Portal offered by [facility] by registering at the following website: [email or web address]. By joining [employer]’s FollowMyHealth portal, you will also be able to view your health information using other applications (apps) compatible with our system.